# Patient Record
Sex: MALE | URBAN - METROPOLITAN AREA
[De-identification: names, ages, dates, MRNs, and addresses within clinical notes are randomized per-mention and may not be internally consistent; named-entity substitution may affect disease eponyms.]

---

## 2019-01-20 ENCOUNTER — EMERGENCY (EMERGENCY)
Facility: HOSPITAL | Age: 49
LOS: 1 days | Discharge: HOME | End: 2019-01-20
Attending: EMERGENCY MEDICINE | Admitting: EMERGENCY MEDICINE
Payer: COMMERCIAL

## 2019-01-20 VITALS
OXYGEN SATURATION: 97 % | HEART RATE: 84 BPM | RESPIRATION RATE: 16 BRPM | TEMPERATURE: 99 F | DIASTOLIC BLOOD PRESSURE: 66 MMHG

## 2019-01-20 VITALS
HEART RATE: 95 BPM | SYSTOLIC BLOOD PRESSURE: 143 MMHG | RESPIRATION RATE: 16 BRPM | DIASTOLIC BLOOD PRESSURE: 85 MMHG | WEIGHT: 203.93 LBS | TEMPERATURE: 99 F | HEIGHT: 69 IN

## 2019-01-20 LAB
ALBUMIN SERPL ELPH-MCNC: 4 G/DL — SIGNIFICANT CHANGE UP (ref 3.3–5)
ALP SERPL-CCNC: 72 U/L — SIGNIFICANT CHANGE UP (ref 30–120)
ALT FLD-CCNC: 39 U/L DA — SIGNIFICANT CHANGE UP (ref 10–60)
ANION GAP SERPL CALC-SCNC: 12 MMOL/L — SIGNIFICANT CHANGE UP (ref 5–17)
AST SERPL-CCNC: 26 U/L — SIGNIFICANT CHANGE UP (ref 10–40)
BASOPHILS # BLD AUTO: 0.04 K/UL — SIGNIFICANT CHANGE UP (ref 0–0.2)
BASOPHILS NFR BLD AUTO: 0.6 % — SIGNIFICANT CHANGE UP (ref 0–2)
BILIRUB SERPL-MCNC: 1.8 MG/DL — HIGH (ref 0.2–1.2)
BUN SERPL-MCNC: 16 MG/DL — SIGNIFICANT CHANGE UP (ref 7–23)
CALCIUM SERPL-MCNC: 9.4 MG/DL — SIGNIFICANT CHANGE UP (ref 8.4–10.5)
CHLORIDE SERPL-SCNC: 104 MMOL/L — SIGNIFICANT CHANGE UP (ref 96–108)
CO2 SERPL-SCNC: 26 MMOL/L — SIGNIFICANT CHANGE UP (ref 22–31)
CREAT SERPL-MCNC: 0.99 MG/DL — SIGNIFICANT CHANGE UP (ref 0.5–1.3)
EOSINOPHIL # BLD AUTO: 0.05 K/UL — SIGNIFICANT CHANGE UP (ref 0–0.5)
EOSINOPHIL NFR BLD AUTO: 0.8 % — SIGNIFICANT CHANGE UP (ref 0–6)
GLUCOSE SERPL-MCNC: 127 MG/DL — HIGH (ref 70–99)
HCT VFR BLD CALC: 51 % — HIGH (ref 39–50)
HGB BLD-MCNC: 17.6 G/DL — HIGH (ref 13–17)
IMM GRANULOCYTES NFR BLD AUTO: 0.2 % — SIGNIFICANT CHANGE UP (ref 0–1.5)
LYMPHOCYTES # BLD AUTO: 1.98 K/UL — SIGNIFICANT CHANGE UP (ref 1–3.3)
LYMPHOCYTES # BLD AUTO: 31.4 % — SIGNIFICANT CHANGE UP (ref 13–44)
MCHC RBC-ENTMCNC: 30.8 PG — SIGNIFICANT CHANGE UP (ref 27–34)
MCHC RBC-ENTMCNC: 34.5 GM/DL — SIGNIFICANT CHANGE UP (ref 32–36)
MCV RBC AUTO: 89.2 FL — SIGNIFICANT CHANGE UP (ref 80–100)
MONOCYTES # BLD AUTO: 0.64 K/UL — SIGNIFICANT CHANGE UP (ref 0–0.9)
MONOCYTES NFR BLD AUTO: 10.1 % — SIGNIFICANT CHANGE UP (ref 2–14)
NEUTROPHILS # BLD AUTO: 3.59 K/UL — SIGNIFICANT CHANGE UP (ref 1.8–7.4)
NEUTROPHILS NFR BLD AUTO: 56.9 % — SIGNIFICANT CHANGE UP (ref 43–77)
NRBC # BLD: 0 /100 WBCS — SIGNIFICANT CHANGE UP (ref 0–0)
PLATELET # BLD AUTO: 240 K/UL — SIGNIFICANT CHANGE UP (ref 150–400)
POTASSIUM SERPL-MCNC: 3.8 MMOL/L — SIGNIFICANT CHANGE UP (ref 3.5–5.3)
POTASSIUM SERPL-SCNC: 3.8 MMOL/L — SIGNIFICANT CHANGE UP (ref 3.5–5.3)
PROT SERPL-MCNC: 7.8 G/DL — SIGNIFICANT CHANGE UP (ref 6–8.3)
RBC # BLD: 5.72 M/UL — SIGNIFICANT CHANGE UP (ref 4.2–5.8)
RBC # FLD: 12.4 % — SIGNIFICANT CHANGE UP (ref 10.3–14.5)
SODIUM SERPL-SCNC: 142 MMOL/L — SIGNIFICANT CHANGE UP (ref 135–145)
WBC # BLD: 6.31 K/UL — SIGNIFICANT CHANGE UP (ref 3.8–10.5)
WBC # FLD AUTO: 6.31 K/UL — SIGNIFICANT CHANGE UP (ref 3.8–10.5)

## 2019-01-20 PROCEDURE — 99283 EMERGENCY DEPT VISIT LOW MDM: CPT

## 2019-01-20 PROCEDURE — 36415 COLL VENOUS BLD VENIPUNCTURE: CPT

## 2019-01-20 PROCEDURE — 85027 COMPLETE CBC AUTOMATED: CPT

## 2019-01-20 PROCEDURE — 80053 COMPREHEN METABOLIC PANEL: CPT

## 2019-01-20 NOTE — ED PROVIDER NOTE - MEDICAL DECISION MAKING DETAILS
Hyperkalemia on outside lab test yesterday. No symptoms. Likely lab error. Plan - Repeat blood test today.

## 2019-01-20 NOTE — ED ADULT NURSE NOTE - MUSCULOSKELETAL WDL
Full range of motion of upper and lower extremities, no joint tenderness/swelling. Principal Discharge DX:	Bronchitis

## 2019-01-20 NOTE — ED PROVIDER NOTE - CHPI ED SYMPTOMS NEG
no vomiting/no back pain/no fever/no headache/no dizziness/no pain/no loss of consciousness/no nausea/no chills/no decreased eating/drinking

## 2019-01-20 NOTE — ED ADULT NURSE NOTE - OBJECTIVE STATEMENT
Sent by PMD for elevated potassium level, 6.5. Non hemolyzed. Blood drawn yesterday. Pt with no complaints.

## 2019-01-20 NOTE — ED PROVIDER NOTE - OBJECTIVE STATEMENT
47 yo male had routine labs done at PMD office yesterday. Was called by MD today for hyperkalemia. K=6.4 reportedly not hemolyzed. Was told to get repeat K drawn today. Denies any symptoms, feels well.   PMD Wilfrid

## 2019-07-04 ENCOUNTER — EMERGENCY (EMERGENCY)
Facility: HOSPITAL | Age: 49
LOS: 1 days | Discharge: ANOTHER HOSPITAL | End: 2019-07-04
Attending: EMERGENCY MEDICINE | Admitting: EMERGENCY MEDICINE
Payer: COMMERCIAL

## 2019-07-04 ENCOUNTER — EMERGENCY (EMERGENCY)
Facility: HOSPITAL | Age: 49
LOS: 1 days | Discharge: HOME | End: 2019-07-04
Attending: EMERGENCY MEDICINE
Payer: COMMERCIAL

## 2019-07-04 VITALS
WEIGHT: 199.96 LBS | HEIGHT: 69 IN | SYSTOLIC BLOOD PRESSURE: 137 MMHG | OXYGEN SATURATION: 99 % | HEART RATE: 162 BPM | DIASTOLIC BLOOD PRESSURE: 77 MMHG | RESPIRATION RATE: 20 BRPM | TEMPERATURE: 98 F

## 2019-07-04 VITALS
RESPIRATION RATE: 14 BRPM | TEMPERATURE: 98 F | SYSTOLIC BLOOD PRESSURE: 125 MMHG | HEART RATE: 115 BPM | OXYGEN SATURATION: 97 % | DIASTOLIC BLOOD PRESSURE: 78 MMHG

## 2019-07-04 VITALS — SYSTOLIC BLOOD PRESSURE: 147 MMHG | HEART RATE: 128 BPM | DIASTOLIC BLOOD PRESSURE: 72 MMHG | TEMPERATURE: 98 F

## 2019-07-04 VITALS
SYSTOLIC BLOOD PRESSURE: 147 MMHG | DIASTOLIC BLOOD PRESSURE: 90 MMHG | TEMPERATURE: 99 F | OXYGEN SATURATION: 99 % | HEIGHT: 69 IN | WEIGHT: 199.96 LBS | HEART RATE: 85 BPM | RESPIRATION RATE: 18 BRPM

## 2019-07-04 LAB
ANION GAP SERPL CALC-SCNC: 7 MMOL/L — SIGNIFICANT CHANGE UP (ref 5–17)
APTT BLD: 30.7 SEC — SIGNIFICANT CHANGE UP (ref 28.5–37)
BASOPHILS # BLD AUTO: 0.06 K/UL — SIGNIFICANT CHANGE UP (ref 0–0.2)
BASOPHILS NFR BLD AUTO: 0.6 % — SIGNIFICANT CHANGE UP (ref 0–2)
BUN SERPL-MCNC: 21 MG/DL — SIGNIFICANT CHANGE UP (ref 7–23)
CALCIUM SERPL-MCNC: 9.4 MG/DL — SIGNIFICANT CHANGE UP (ref 8.4–10.5)
CHLORIDE SERPL-SCNC: 105 MMOL/L — SIGNIFICANT CHANGE UP (ref 96–108)
CO2 SERPL-SCNC: 29 MMOL/L — SIGNIFICANT CHANGE UP (ref 22–31)
CREAT SERPL-MCNC: 1 MG/DL — SIGNIFICANT CHANGE UP (ref 0.5–1.3)
EOSINOPHIL # BLD AUTO: 0.23 K/UL — SIGNIFICANT CHANGE UP (ref 0–0.5)
EOSINOPHIL NFR BLD AUTO: 2.3 % — SIGNIFICANT CHANGE UP (ref 0–6)
GLUCOSE SERPL-MCNC: 134 MG/DL — HIGH (ref 70–99)
HCT VFR BLD CALC: 51.5 % — HIGH (ref 39–50)
HGB BLD-MCNC: 17.4 G/DL — HIGH (ref 13–17)
IMM GRANULOCYTES NFR BLD AUTO: 0.3 % — SIGNIFICANT CHANGE UP (ref 0–1.5)
INR BLD: 1.09 RATIO — SIGNIFICANT CHANGE UP (ref 0.88–1.16)
LYMPHOCYTES # BLD AUTO: 4.2 K/UL — HIGH (ref 1–3.3)
LYMPHOCYTES # BLD AUTO: 42.1 % — SIGNIFICANT CHANGE UP (ref 13–44)
MAGNESIUM SERPL-MCNC: 1.9 MG/DL — SIGNIFICANT CHANGE UP (ref 1.6–2.6)
MCHC RBC-ENTMCNC: 31.1 PG — SIGNIFICANT CHANGE UP (ref 27–34)
MCHC RBC-ENTMCNC: 33.8 GM/DL — SIGNIFICANT CHANGE UP (ref 32–36)
MCV RBC AUTO: 92 FL — SIGNIFICANT CHANGE UP (ref 80–100)
MONOCYTES # BLD AUTO: 1.27 K/UL — HIGH (ref 0–0.9)
MONOCYTES NFR BLD AUTO: 12.7 % — SIGNIFICANT CHANGE UP (ref 2–14)
NEUTROPHILS # BLD AUTO: 4.18 K/UL — SIGNIFICANT CHANGE UP (ref 1.8–7.4)
NEUTROPHILS NFR BLD AUTO: 42 % — LOW (ref 43–77)
NRBC # BLD: 0 /100 WBCS — SIGNIFICANT CHANGE UP (ref 0–0)
PLATELET # BLD AUTO: 253 K/UL — SIGNIFICANT CHANGE UP (ref 150–400)
POTASSIUM SERPL-MCNC: 3.8 MMOL/L — SIGNIFICANT CHANGE UP (ref 3.5–5.3)
POTASSIUM SERPL-SCNC: 3.8 MMOL/L — SIGNIFICANT CHANGE UP (ref 3.5–5.3)
PROTHROM AB SERPL-ACNC: 11.9 SEC — SIGNIFICANT CHANGE UP (ref 10–12.9)
RBC # BLD: 5.6 M/UL — SIGNIFICANT CHANGE UP (ref 4.2–5.8)
RBC # FLD: 12.8 % — SIGNIFICANT CHANGE UP (ref 10.3–14.5)
SODIUM SERPL-SCNC: 141 MMOL/L — SIGNIFICANT CHANGE UP (ref 135–145)
TROPONIN I SERPL-MCNC: 0 NG/ML — LOW (ref 0.02–0.06)
WBC # BLD: 9.97 K/UL — SIGNIFICANT CHANGE UP (ref 3.8–10.5)
WBC # FLD AUTO: 9.97 K/UL — SIGNIFICANT CHANGE UP (ref 3.8–10.5)

## 2019-07-04 PROCEDURE — 99285 EMERGENCY DEPT VISIT HI MDM: CPT | Mod: 25

## 2019-07-04 PROCEDURE — 96374 THER/PROPH/DIAG INJ IV PUSH: CPT

## 2019-07-04 PROCEDURE — 99285 EMERGENCY DEPT VISIT HI MDM: CPT

## 2019-07-04 PROCEDURE — 84484 ASSAY OF TROPONIN QUANT: CPT

## 2019-07-04 PROCEDURE — 96375 TX/PRO/DX INJ NEW DRUG ADDON: CPT

## 2019-07-04 PROCEDURE — 85730 THROMBOPLASTIN TIME PARTIAL: CPT

## 2019-07-04 PROCEDURE — 93005 ELECTROCARDIOGRAM TRACING: CPT

## 2019-07-04 PROCEDURE — 85027 COMPLETE CBC AUTOMATED: CPT

## 2019-07-04 PROCEDURE — 71045 X-RAY EXAM CHEST 1 VIEW: CPT | Mod: 26

## 2019-07-04 PROCEDURE — 71045 X-RAY EXAM CHEST 1 VIEW: CPT

## 2019-07-04 PROCEDURE — 99284 EMERGENCY DEPT VISIT MOD MDM: CPT | Mod: 25

## 2019-07-04 PROCEDURE — 85610 PROTHROMBIN TIME: CPT

## 2019-07-04 PROCEDURE — 99284 EMERGENCY DEPT VISIT MOD MDM: CPT

## 2019-07-04 PROCEDURE — 93005 ELECTROCARDIOGRAM TRACING: CPT | Mod: 76

## 2019-07-04 PROCEDURE — 80048 BASIC METABOLIC PNL TOTAL CA: CPT

## 2019-07-04 PROCEDURE — 36415 COLL VENOUS BLD VENIPUNCTURE: CPT

## 2019-07-04 PROCEDURE — 83735 ASSAY OF MAGNESIUM: CPT

## 2019-07-04 PROCEDURE — 93010 ELECTROCARDIOGRAM REPORT: CPT | Mod: 76

## 2019-07-04 PROCEDURE — 93010 ELECTROCARDIOGRAM REPORT: CPT

## 2019-07-04 RX ORDER — SODIUM CHLORIDE 9 MG/ML
1000 INJECTION INTRAMUSCULAR; INTRAVENOUS; SUBCUTANEOUS ONCE
Refills: 0 | Status: COMPLETED | OUTPATIENT
Start: 2019-07-04 | End: 2019-07-04

## 2019-07-04 RX ORDER — METOPROLOL TARTRATE 50 MG
1 TABLET ORAL
Qty: 30 | Refills: 0
Start: 2019-07-04 | End: 2019-08-02

## 2019-07-04 RX ORDER — DILTIAZEM HCL 120 MG
10 CAPSULE, EXT RELEASE 24 HR ORAL ONCE
Refills: 0 | Status: COMPLETED | OUTPATIENT
Start: 2019-07-04 | End: 2019-07-04

## 2019-07-04 RX ORDER — METOPROLOL TARTRATE 50 MG
5 TABLET ORAL ONCE
Refills: 0 | Status: COMPLETED | OUTPATIENT
Start: 2019-07-04 | End: 2019-07-04

## 2019-07-04 RX ORDER — NIACIN 50 MG
0 TABLET ORAL
Qty: 0 | Refills: 0 | DISCHARGE

## 2019-07-04 RX ORDER — DILTIAZEM HCL 120 MG
30 CAPSULE, EXT RELEASE 24 HR ORAL ONCE
Refills: 0 | Status: COMPLETED | OUTPATIENT
Start: 2019-07-04 | End: 2019-07-04

## 2019-07-04 RX ORDER — METOPROLOL TARTRATE 50 MG
50 TABLET ORAL ONCE
Refills: 0 | Status: COMPLETED | OUTPATIENT
Start: 2019-07-04 | End: 2019-07-04

## 2019-07-04 RX ADMIN — Medication 5 MILLIGRAM(S): at 08:45

## 2019-07-04 RX ADMIN — Medication 50 MILLIGRAM(S): at 10:59

## 2019-07-04 RX ADMIN — SODIUM CHLORIDE 1000 MILLILITER(S): 9 INJECTION INTRAMUSCULAR; INTRAVENOUS; SUBCUTANEOUS at 03:20

## 2019-07-04 RX ADMIN — SODIUM CHLORIDE 1000 MILLILITER(S): 9 INJECTION INTRAMUSCULAR; INTRAVENOUS; SUBCUTANEOUS at 04:14

## 2019-07-04 RX ADMIN — SODIUM CHLORIDE 1000 MILLILITER(S): 9 INJECTION INTRAMUSCULAR; INTRAVENOUS; SUBCUTANEOUS at 05:08

## 2019-07-04 RX ADMIN — Medication 10 MILLIGRAM(S): at 05:52

## 2019-07-04 RX ADMIN — Medication 10 MILLIGRAM(S): at 03:52

## 2019-07-04 RX ADMIN — Medication 30 MILLIGRAM(S): at 05:54

## 2019-07-04 NOTE — ED PROVIDER NOTE - CLINICAL SUMMARY MEDICAL DECISION MAKING FREE TEXT BOX
49M, hx of HLD, presents as transfer from [facility] for further cardiology evaluation after presenting with palpitations, noted to be tachycardic to 160s - 190s. No other associated sx. Labs, CXR wnl. Will consult cardiology for further care. Will administer further dose of diltiazem.  Currently stable, no acute distress. Will continue to follow up and re-assess. Case discussed with Attending.  Alireza Monreal MD, PGY3 Emergency Medicine 49M, hx of HLD, presents as transfer from [facility] for further cardiology evaluation after presenting with palpitations, noted to be tachycardic to 160s - 190s. No other associated sx. Labs, CXR wnl. Will consult cardiology for further care. EKG concerning for possible MAT. Will administer further dose of diltiazem.  Currently stable, no acute distress. Will continue to follow up and re-assess. Case discussed with Attending.  Alireza Monreal MD, PGY3 Emergency Medicine

## 2019-07-04 NOTE — ED ADULT NURSE REASSESSMENT NOTE - NS ED NURSE REASSESS COMMENT FT1
Pt resting quietly on stretcher with cardiac monitor in place showing sinus arrythmia. Safety and comfort measures maintained.

## 2019-07-04 NOTE — ED PROVIDER NOTE - CLINICAL SUMMARY MEDICAL DECISION MAKING FREE TEXT BOX
49 y.o. M with h/o dyslipidemia, now presents with palpitations, never had this before, pt's heartrate rapidly fluctuating between 90s-200, appears sinus with multifocal PVCs when slows, BP normal, pt otherwise asymptomatic, iv, hydrate, labs/e-lytes/trop, ekgs, will d/w cardiology [facility]

## 2019-07-04 NOTE — ED PROVIDER NOTE - NSFOLLOWUPINSTRUCTIONS_ED_ALL_ED_FT
1. Return to ED for worsening, progressive or any other concerning symptoms   2. Follow up with your primary care doctor in 2-3days  3. Follow up with Dr. Montel[facility].  4. Take metoprolol 50mg daily.  5. Return if you have chest pain, you feel palpitations, you don't feel like your normal self.

## 2019-07-04 NOTE — ED PROVIDER NOTE - PHYSICAL EXAMINATION
General: Well appearing, alert, oriented, no acute distress. Resting in bed.  HEENT: PERRLA EOMI. No trauma/bruising noted to head or face.   CV: TACHY rate and regular rhythm, S1/S2, no murmurs/rubs/gallops noted on exam.   Lungs: Clear to ascultation bilaterally, no wheezes/crackles/rales noted on exam.   Abdomen: Soft, non tender, non distended, no guarding or rebound.   MSK: Full ROM of upper and lower extremities bilaterally. Full ROM of neck. No gross deformities noted to extremities.  Neuro: Awake, A+O x4, moving all extremities spontaneously. CN 2-12 grossly intact. Strength and sensation grossly intact to all extremities.  Extremities: No swelling or edema noted to extremities. No calf tenderness to palpation.   Skin: No rash noted on exam.

## 2019-07-04 NOTE — ED PROVIDER NOTE - ATTENDING CONTRIBUTION TO CARE
attending Lynette: 49yM h/o HLD transferred from [facility] for cardiology evaluation for palpitations. Presented with HR 160s - 190s. No other associated sx. Will place on tele, repeat ekg, review OSH labs, additional dose Cardizem for tachycardia, consult cardiology for concern for MAT

## 2019-07-04 NOTE — ED PROVIDER NOTE - NOTES
discussed case - pt with palpitations rapidly alternating between HR in 90s and 190s, EKG at 180 reading as afib, however slower EKGs are sinus/sinus tach with multifocal PVCs, faxed EKGs, cardiology fellow recommends dose of diltiazem IV and then oral diltiazem 30mg q6hr following, states will likely need exercise stress as further work up, awaiting labs, no further recommendations at this time and no need for transfer at this time

## 2019-07-04 NOTE — ED PROVIDER NOTE - OBJECTIVE STATEMENT
49M, hx of HLD, presents as transfer from [facility] for further cardiology evaluation. Patient woke up from sleep around 3am with palpitations. No associated fevers or chills, nausea or vomiting, chest pain, shortness of breath, abdominal pain, diarrhea or constipation, urinary sx, cough, headache, dizziness, lightheadedness, blurry vision, weakness, numbness, back or neck pain. Patient said he drank ~6 beers yesterday plus some vodka while palying golf, not a normal alcohol drinker.  No recent travel, illness, sick contacts. No leg swelling, calf pain. No hx DVT/PE. No hx thyroid disease. No supplement use, no caffeine, no tobacco, ethanol, or drug use. No allergies.   Son has hx of SVT requiring ablation.   PCP Dr. DEZ Medina    Patient seen at [place] - received 3L fluid, 10 diltiazem. Labs all grossly wnl. Sent to [facility] for further evaluation.

## 2019-07-04 NOTE — ED ADULT NURSE NOTE - OBJECTIVE STATEMENT
50 y/o Male presenting to ED by EMS, A&Ox3, transferred from [place] for tachycardiac arrythmia and cardiac consult. Pt went to [place] ED for feelings of palpitations that awoke him from his sleep. Pt received Cardizem at [place] which brought his heart rate down from the 170's to 140's. Pt in the 150's upon arrival on the cardiac monitor. 10 IV Cardizem and 30 PO given upon arrival and pt heart rate decreased to 120's. Pt denies chest pain, shortness of breath, dizziness, headache, N/V/D, fever, chills, recent travel, smoking. Skin warm dry and intact. Pt appears hyperactive at this time, pt speaking very fast and appears extremely anxious. Pt denies any drug use, pt reports alcohol use yesterday afternoon. Safety and comfort measures provided. Bed in lowest position. Side rails up for safety. Pt remains on cardiac monitor. 48 y/o Male presenting to ED by EMS, A&Ox3, transferred from [place] for tachycardiac arrythmia and cardiac consult. Pt went to [place] ED for feelings of palpitations that awoke him from his sleep. Pt received Cardizem at [place] which brought his heart rate down from the 170's to 140's. Pt in the 150's upon arrival on the cardiac monitor. 10 IV Cardizem and 30 PO given upon arrival and pt heart rate decreased to 120's. Pt denies chest pain, shortness of breath, dizziness, headache, N/V/D, fever, chills, recent travel, smoking. Skin warm dry and intact. Pt appears hyperactive at this time, pt speaking very fast and appears extremely anxious. Pt denies any drug use, pt reports alcohol use yesterday afternoon. Safety and comfort measures provided. Bed in lowest position. Side rails up for safety. Pt remains on cardiac monitor. Awaiting cardiac consult.

## 2019-07-04 NOTE — CONSULT NOTE ADULT - SUBJECTIVE AND OBJECTIVE BOX
MRN-[id number]    CHIEF COMPLAINT:  Patient is a 49y old  Male who presents with a chief complaint of     HISTORY OF PRESENT ILLNESS:  VIRAJ THORPE is a 49y Male patient with past medical history of *** presenting with ***.     Allergies    No Known Allergies    Intolerances    	    PAST MEDICAL & SURGICAL HISTORY:  High cholesterol  No pertinent past medical history  No significant past surgical history      FAMILY HISTORY:      SOCIAL HISTORY:    [ ] Non-smoker  [ ] Smoker  [ ] Alcohol    REVIEW OF SYSTEMS:  CONSTITUTIONAL: No fever, weight loss, or fatigue  EYES: No eye pain, visual disturbances, or discharge  ENMT:  No difficulty hearing, tinnitus, vertigo; No sinus or throat pain  NECK: No pain or stiffness  RESPIRATORY: No cough, wheezing, chills or hemoptysis; No Shortness of Breath  CARDIOVASCULAR: No chest pain, palpitations, passing out, dizziness, or leg swelling  GASTROINTESTINAL: No abdominal or epigastric pain. No nausea, vomiting, or hematemesis; No diarrhea or constipation. No melena or hematochezia.  GENITOURINARY: No dysuria, frequency, hematuria, or incontinence  NEUROLOGICAL: No headaches, memory loss, loss of strength, numbness, or tremors  SKIN: No itching, burning, rashes, or lesions   LYMPH Nodes: No enlarged glands  ENDOCRINE: No heat or cold intolerance; No hair loss  MUSCULOSKELETAL: No joint pain or swelling; No muscle, back, or extremity pain  PSYCHIATRIC: No depression, anxiety, mood swings, or difficulty sleeping  HEME/LYMPH: No easy bruising, or bleeding gums  ALLERY AND IMMUNOLOGIC: No hives or eczema	    [ ] All others negative	  [ ] Unable to obtain    I&O's Summary      PHYSICAL EXAM:  Vital Signs Last 24 Hrs  T(C): 36.8 (04 Jul 2019 05:38), Max: 37.3 (04 Jul 2019 03:04)  T(F): 98.3 (04 Jul 2019 05:38), Max: 99.2 (04 Jul 2019 03:04)  HR: 121 (04 Jul 2019 05:55) (85 - 205)  BP: 140/78 (04 Jul 2019 05:55) (132/55 - 169/84)  BP(mean): --  RR: 18 (04 Jul 2019 05:55) (18 - 22)  SpO2: 98% (04 Jul 2019 05:55) (98% - 99%)  Appearance: Normal	  HEENT:   Normal oral mucosa, PERRL, EOMI	  Lymphatic: No lymphadenopathy  Cardiovascular: Normal S1 S2, No JVD, No murmurs, No edema  Respiratory: Lungs clear to auscultation	  Psychiatry: A & O x 3, Mood & affect appropriate  Gastrointestinal:  Soft, Non-tender, + BS	  Skin: No rashes, No ecchymoses, No cyanosis	  Neurologic: Non-focal  Extremities: Normal range of motion, No clubbing, cyanosis or edema  Vascular: Peripheral pulses palpable 2+ bilaterally    MEDICATIONS:  MEDICATIONS  (STANDING):    MEDICATIONS  (PRN):      LABS:	 	  CBC Full  -  ( 04 Jul 2019 03:32 )  WBC Count : 9.97 K/uL  Hemoglobin : 17.4 g/dL  Hematocrit : 51.5 %  Platelet Count - Automated : 253 K/uL  Mean Cell Volume : 92.0 fl  Mean Cell Hemoglobin : 31.1 pg  Mean Cell Hemoglobin Concentration : 33.8 gm/dL  Auto Neutrophil # : 4.18 K/uL  Auto Lymphocyte # : 4.20 K/uL  Auto Monocyte # : 1.27 K/uL  Auto Eosinophil # : 0.23 K/uL  Auto Basophil # : 0.06 K/uL  Auto Neutrophil % : 42.0 %  Auto Lymphocyte % : 42.1 %  Auto Monocyte % : 12.7 %  Auto Eosinophil % : 2.3 %  Auto Basophil % : 0.6 %    07-04    141  |  105  |  21  ----------------------------<  134<H>  3.8   |  29  |  1.00    Ca    9.4      04 Jul 2019 03:32  Mg     1.9     07-04      PT/INR - ( 04 Jul 2019 04:09 )   PT: 11.9 sec;   INR: 1.09 ratio         PTT - ( 04 Jul 2019 04:09 )  PTT:30.7 sec  CARDIAC MARKERS ( 04 Jul 2019 03:52 )  .000 ng/mL / x     / x     / x     / x            proBNP:   Lipid Profile:   HgA1c:   TSH:     TELEMETRY: 	    ECG:  	  RADIOLOGY:  OTHER: 	    CARDIAC TESTING/STUDIES:    [ ] Echocardiogram:  [ ]  Catheterization:  [ ] Stress Test:  	  	  ASSESSMENT/PLAN: 	      Laurie Sullivan MD, MPH, VIVI  Cardiovascular Specialist Attending  [facility]  C: [phone]  E: [email or web address]  (Cardiology Nocturnist cell number available 7 pm - 7 am every night; available daytime week days for follow-up only; daytime weekends covered by general cardiology consult service) MRN-[id number]    CHIEF COMPLAINT:  Palpitations     HISTORY OF PRESENT ILLNESS:  VIRAJ THORPE is a 49y Male patient with past medical history of HLD, presenting with palpitations and no other associated symptoms after drinking more alcohol than his usual amount. No other medical problems and does not take any medications. ECG with ***, Telemetry rates 100-140. Cardiac enzyme negative. Given IVF bolus as well 10 mg diltiazem with minimal improvement. Cardiology consulted for further management.      Allergies  No Known Allergies	    PAST MEDICAL & SURGICAL HISTORY:  High cholesterol  No pertinent past medical history  No significant past surgical history    FAMILY HISTORY:  Son with SVT ablation.     SOCIAL HISTORY:    Alcohol    REVIEW OF SYSTEMS:  CONSTITUTIONAL: No fever, weight loss, or fatigue  EYES: No eye pain  ENMT:  No difficulty hearing  NECK: No pain or stiffness  RESPIRATORY: No cough, wheezing; No Shortness of Breath  CARDIOVASCULAR: No chest pain, passing out, dizziness, or leg swelling. Positive palpitations.   GASTROINTESTINAL: No abdominal or epigastric pain. No nausea, vomiting, or hematemesis; No diarrhea or constipation.  GENITOURINARY: No dysuria, frequency  NEUROLOGICAL: No headaches, memory loss, loss of strength.   SKIN: No itching, burning, rashes.   MUSCULOSKELETAL: No joint pain or swelling  PSYCHIATRIC: No depression, anxiety  HEME/LYMPH: No easy bruising,    PHYSICAL EXAM:  Vital Signs Last 24 Hrs  T(C): 36.8 (2019 05:38), Max: 37.3 (2019 03:04)  T(F): 98.3 (2019 05:38), Max: 99.2 (2019 03:04)  HR: 121 (2019 05:55) (85 - 205)  BP: 140/78 (2019 05:55) (132/55 - 169/84)  RR: 18 (2019 05:55) (18 - 22)  SpO2: 98% (2019 05:55) (98% - 99%)    Appearance: Normal	  HEENT:   Normal oral mucosa	  Lymphatic: No lymphadenopathy  Cardiovascular: Normal S1 S2, No edema, irregularly irregular tachycardic.   Respiratory: Lungs clear to auscultation  Psychiatry: A & O x 3  Gastrointestinal:  Soft, Non-tender  Skin: No rashes, No ecchymoses, No cyanosis	  Neurologic: Non-focal  Extremities: No clubbing, cyanosis or edema  Vascular: Peripheral pulses palpable 2+ bilaterally    LABS:	 	  CBC Full  -  ( 2019 03:32 )  WBC Count : 9.97 K/uL  Hemoglobin : 17.4 g/dL  Hematocrit : 51.5 %  Platelet Count - Automated : 253 K/uL  Mean Cell Volume : 92.0 fl  Mean Cell Hemoglobin : 31.1 pg  Mean Cell Hemoglobin Concentration : 33.8 gm/dL  Auto Neutrophil # : 4.18 K/uL  Auto Lymphocyte # : 4.20 K/uL  Auto Monocyte # : 1.27 K/uL  Auto Eosinophil # : 0.23 K/uL  Auto Basophil # : 0.06 K/uL  Auto Neutrophil % : 42.0 %  Auto Lymphocyte % : 42.1 %  Auto Monocyte % : 12.7 %  Auto Eosinophil % : 2.3 %  Auto Basophil % : 0.6 %    07-    141  |  105  |  21  ----------------------------<  134<H>  3.8   |  29  |  1.00    Ca    9.4      2019 03:32  Mg     1.9     07-04    PT/INR - ( 2019 04:09 )   PT: 11.9 sec;   INR: 1.09 ratio      PTT - ( 2019 04:09 )  PTT:30.7 sec  CARDIAC MARKERS ( 2019 03:52 )  .000 ng/mL / x     / x     / x     / x        TELEMETRY: 2019   Tachycardic     EC2019  ***    CARDIAC TESTING/STUDIES:    N/A  	  ASSESSMENT/PLAN: 	  9y Male patient with past medical history of HLD, presenting with palpitations found with narrow complex, irregularly irregular tachycardia.     1) Irregularly irregular tachycardia.   Pending detailed review   Asymptomatic    Hemodynamically stable.   Minimal diltiazem response.   Differential, MAT, Atrial Fibrillation, Atrial Flutter, SVT (AVNRT, AVRT, AT); assessing RP interval pending. In th setting of no pulmonary pathology, MAT would be unlikely but not impossible.     ·	Given minimal response to diltiazem, it is reasonable to attempt 5 mg IV metoprolol push as it is also an AV ludin blocking agent with different properties than diltiazem of which he may respond more favorably to.   ·	Lastly, if he does not respond within 30 - 60 minutes, we can trial adenosine 6 mg IV push followed by saline flush, which will allow us to clearly visualize that hte atrial are doing (p waves) and make a solid diagnosis. If 6 mg does not work, attempt a second time, and if that doesn't work, the third time should be 12 mg once. It was cause a pause of approximatively 3-8 seconds, and the patient will feel flushed. But this ia very safe drug and does not require post administration monitoring Run the ECG while injecting adenosine if this approach is taken after inadequate response to diltiazem and metoprolol.     Call failed while speaking with the ED and unable to reach at his time. Please call personal mobile phone below as needed. Will stop by in approximately 15 -30 minutes.     Laurie Sullivan MD, MPH, VIVI  Cardiovascular Specialist Attending  [facility]  C: [phone]  Personal Mobile: [phone]   E: [email or web address]  (Cardiology Nocturnist cell number available 7 pm - 7 am every night; available daytime week days for follow-up only; daytime weekends covered by general cardiology consult service) MRN-[id number]    CHIEF COMPLAINT:  Palpitations     HISTORY OF PRESENT ILLNESS:  VIRAJ THORPE is a 49y Male patient with past medical history of HLD, presenting with palpitations and no other associated symptoms after drinking more alcohol than his usual amount. No other medical problems and does not take any medications. ECG with atrial tachycardia and intermittent rapid runs, Telemetry rates  upon evaluation. Cardiac enzyme negative. Given IVF bolus as well 10 mg diltiazem with minimal improvement. Given IV 5 mg metoprolol push and his the frequency of atrial tachycardia decreased substantially Starting on PO metoprolol succinate 50 mg daily. Counseled on alcohol cessation. Upon further questions, he reports that he will occasionally feel one ot two "skipped beats" but did not think much about it. Exacerbation likely related to his alcohol consumption of which he reports 4-5 drinks at once, everything 3-4 days and then make your ioana to likely secondary to alcohol consumption of which the wifes cant come with hyus .   despite having a fairly large AT burden. Cardiology consulted for further management of cardiac issues and transportation.     Allergies  No Known Allergies	    PAST MEDICAL & SURGICAL HISTORY:  High cholesterol  No pertinent past medical history  No significant past surgical history    FAMILY HISTORY:  Son with SVT ablation.     SOCIAL HISTORY:    Alcohol    REVIEW OF SYSTEMS:  CONSTITUTIONAL: No fever, weight loss, Pisitive  EYES: No eye pain  ENMT:  No difficulty hearing  NECK: No pain or stiffness  RESPIRATORY: No cough, wheezing; No Shortness of Breath  CARDIOVASCULAR: No chest pain, passing out, dizziness, or leg swelling. Positive palpitations.   GASTROINTESTINAL: No abdominal or epigastric pain. No nausea, vomiting.  NEUROLOGICAL: No headaches, memory loss, loss of strength.   SKIN: No itching, burning, rashes.   MUSCULOSKELETAL: No joint pain. Positive swelling  PSYCHIATRIC: No depression, anxiety.  HEME/LYMPH: No easy bruising,    PHYSICAL EXAM:  Vital Signs Last 24 Hrs  T(C): 36.8 (2019 05:38), Max: 37.3 (2019 03:04)  T(F): 98.3 (2019 05:38), Max: 99.2 (2019 03:04)  HR: 121 (2019 05:55) (85 - 205)  BP: 140/78 (2019 05:55) (132/55 - 169/84)  RR: 18 (2019 05:55) (18 - 22)  SpO2: 98% (2019 05:55) (98% - 99%)    Appearance: Normal	  HEENT:   Normal oral mucosa	  Lymphatic: No lymphadenopathy  Cardiovascular: Normal S1 S2, No edema, irregularly irregular tachycardic.   Respiratory: Lungs clear to auscultation  Psychiatry: A & O x 3  Gastrointestinal:  Soft, Non-tender  Skin: No rashes, No ecchymoses, No cyanosis	  Neurologic: Non-focal  Extremities: No clubbing, cyanosis or edema  Vascular: Peripheral pulses palpable 2+ bilaterally    LABS:	 	  CBC Full  -  ( 2019 03:32 )  WBC Count : 9.97 K/uL  Hemoglobin : 17.4 g/dL  Hematocrit : 51.5 %  Platelet Count - Automated : 253 K/uL  Mean Cell Volume : 92.0 fl  Mean Cell Hemoglobin : 31.1 pg  Mean Cell Hemoglobin Concentration : 33.8 gm/dL  Auto Neutrophil # : 4.18 K/uL  Auto Lymphocyte # : 4.20 K/uL  Auto Monocyte # : 1.27 K/uL  Auto Eosinophil # : 0.23 K/uL  Auto Basophil # : 0.06 K/uL  Auto Neutrophil % : 42.0 %  Auto Lymphocyte % : 42.1 %  Auto Monocyte % : 12.7 %  Auto Eosinophil % : 2.3 %  Auto Basophil % : 0.6 %    07-04    141  |  105  |  21  ----------------------------<  134<H>  3.8   |  29  |  1.00    Ca    9.4      2019 03:32  Mg     1.9     07-04    PT/INR - ( 2019 04:09 )   PT: 11.9 sec;   INR: 1.09 ratio      PTT - ( 2019 04:09 )  PTT:30.7 sec  CARDIAC MARKERS ( 2019 03:52 )  .000 ng/mL / x     / x     / x     / x        TELEMETRY: 2019   Tachycardic     EC2019  Atrial Tachycardia     CARDIAC TESTING/STUDIES:    N/A  	  ASSESSMENT/PLAN: 	  9y Male patient with past medical history of HLD, presenting with palpitations found with narrow complex, irregularly irregular tachycardia, suggestive of atrial tachycardia.     1) Irregularly irregular tachycardia/ atrial tachycardia  Pending detailed review   Asymptomatic    Hemodynamically stable.   Minimal diltiazem response.   Differential, MAT, Atrial Fibrillation, Atrial Flutter, SVT (AVNRT, AVRT, AT); assessing RP interval pending. In th setting of no pulmonary pathology, MAT would be unlikely but not impossible.     ·	Given minimal response to diltiazem, it is reasonable to attempt 5 mg IV metoprolol push as it is also an AV ludin blocking agent with different properties than diltiazem of which he may respond more favorably to. Start metoprolol cussineate 50 mg daily and starttable,  ·	Lastly, if he does not respond within 30 - 60 minutes, we can trial adenosine 6 mg IV push followed by saline flush, which will allow us to clearly visualize that hte atrial are doing (p waves) and make a solid diagnosis. If 6 mg does not work, attempt a second time, and if that doesn't work, the third time should be 12 mg once. It was cause a pause of approximatively 3-8 seconds, and the patient will feel flushed. But this is very safe drug and does not require post administration monitoring. Run the ECG while injecting adenosine if this approach is taken after inadequate response to diltiazem and metoprolol.     After being administered the IV and PO metoprolol, the frequency of atrial tachycardia significant decreased and is overall heart rate is <110. Acceptable for discharge with  out-patient follow up regarding symptoms.     Laurie Sullivan MD, MPH, VIVI  Cardiovascular Specialist Attending  [facility]  C: [phone]  Personal Mobile: [phone]   E: [email or web address]  (Cardiology Nocturnist cell number available 7 pm - 7 am every night; available daytime week days for follow-up only; daytime weekends covered by general cardiology consult service)

## 2019-07-04 NOTE — ED ADULT NURSE NOTE - NSIMPLEMENTINTERV_GEN_ALL_ED
Implemented All Universal Safety Interventions:  [place] to call system. Call bell, personal items and telephone within reach. Instruct patient to call for assistance. Room bathroom lighting operational. Non-slip footwear when patient is off stretcher. Physically safe environment: no spills, clutter or unnecessary equipment. Stretcher in lowest position, wheels locked, appropriate side rails in place.

## 2019-07-04 NOTE — ED PROVIDER NOTE - CARE PROVIDER_API CALL
Cornelio Laurent (MD)  Cardiac Electrophysiology; Cardiology; Internal Medicine  [address] [place] [address]  Phone: [phone]  Follow Up Time:

## 2019-07-04 NOTE — ED ADULT NURSE REASSESSMENT NOTE - NS ED NURSE REASSESS COMMENT FT1
Pt AAOx4, NAD, resp nonlabored, resting comfortably in bed with family at bedside. Pt c/o slight palpitations. Pt placed on cardiac monitor, sinus arrythmia, HR currently bounces between 90's to 115. Pt denies headache, dizziness, chest pain, SOB, abd pain, n/v/d, urinary symptoms, fevers, chills, weakness at this time. As per MD Maher will hold of the metoprolol because of pt's heart rate. Pt awaiting cardiology consult . Safety maintained.

## 2019-07-04 NOTE — ED PROVIDER NOTE - PROGRESS NOTE DETAILS
Cardiology consulted  Alireza Monreal MD, PGY3 Emergency Medicine Patient re-evaluated.  Labs and imaging reviewed. D/w cardiology, patient to follow up with outpatient cardiology, started on Metoprolol. Currently rate is WNL. Strict return precautions given.  All labs and imaging results (if any), were reviewed with the patient. Patient understands to follow up with their regular doctor. Patient understands to return to the ED is symptoms worsen or progress. Discharge instructions were given to the patient and discussed with patient. Rx (if any) were electronically sent to patient's preferred pharmacy.

## 2019-07-04 NOTE — ED PROVIDER NOTE - OBJECTIVE STATEMENT
49 y.o. M presents for palpitations, started about 1 hr ago, denies h/o similar symptoms in the past, no cp, no sob, no dizziness, at times feeling heart beating very fast. pt did drink a few alcoholic beverages last night, nothing unusual, does not drink daily, non-smoker, denies drugs, denies supplements/herbals, pt has h/o hypertriglyceridemia and high cholesterol - is not taking meds for this anymore (stopped on his own)    PCP = Dr. DEZ Medina    pt's son had ablation for SVT recently at [facility]

## 2019-07-04 NOTE — ED PROVIDER NOTE - PROGRESS NOTE DETAILS
pt agrees to small dose anxiolytic, states his wife tells him he is an anxious person, does appear quite anxious in the ED, as this may be contributing to some of the tachycardia, pt accepts a small dose ativan 0.5mg IV pt's wife at bedside, pt declined ativan for now, as he is anxious at baseline, accepts the diltiazem, wife is asking if can transfer to [place], advise that this is not possible in the middle of the night, have discussed case with [facility] if requires transfer urgently, wife asks if she can drive him to [place], advised that they would have to sign out AMA, risk of death given tachydysrhythmia, wife states will stay here for now, will see how pt does with medication pt's HR between 100-150, appears sinus with clear p waves when slows, have called back [facility] to speak with fellow, unclear that this is afib, as clearly having sinus beats in 150s, has multifocal pvcs, and while BP stable, request transfer to [facility] for cardiology consult and further work up/intervention EMS here for transfer

## 2019-07-10 PROBLEM — E78.00 PURE HYPERCHOLESTEROLEMIA, UNSPECIFIED: Chronic | Status: ACTIVE | Noted: 2019-07-04

## 2019-07-12 ENCOUNTER — APPOINTMENT (OUTPATIENT)
Dept: INTERNAL MEDICINE | Facility: CLINIC | Age: 49
End: 2019-07-12
Payer: COMMERCIAL

## 2019-07-12 VITALS
HEIGHT: 69 IN | TEMPERATURE: 98.4 F | HEART RATE: 78 BPM | DIASTOLIC BLOOD PRESSURE: 82 MMHG | WEIGHT: 212.13 LBS | OXYGEN SATURATION: 98 % | BODY MASS INDEX: 31.42 KG/M2 | RESPIRATION RATE: 16 BRPM | SYSTOLIC BLOOD PRESSURE: 140 MMHG

## 2019-07-12 DIAGNOSIS — Z86.39 PERSONAL HISTORY OF OTHER ENDOCRINE, NUTRITIONAL AND METABOLIC DISEASE: ICD-10-CM

## 2019-07-12 DIAGNOSIS — Z78.9 OTHER SPECIFIED HEALTH STATUS: ICD-10-CM

## 2019-07-12 DIAGNOSIS — I47.1 SUPRAVENTRICULAR TACHYCARDIA: ICD-10-CM

## 2019-07-12 DIAGNOSIS — F10.10 ALCOHOL ABUSE, UNCOMPLICATED: ICD-10-CM

## 2019-07-12 DIAGNOSIS — Z86.79 PERSONAL HISTORY OF OTHER DISEASES OF THE CIRCULATORY SYSTEM: ICD-10-CM

## 2019-07-12 DIAGNOSIS — G47.33 OBSTRUCTIVE SLEEP APNEA (ADULT) (PEDIATRIC): ICD-10-CM

## 2019-07-12 PROCEDURE — 99214 OFFICE O/P EST MOD 30 MIN: CPT

## 2019-07-14 PROBLEM — G47.33 MILD OBSTRUCTIVE SLEEP APNEA: Status: RESOLVED | Noted: 2019-07-12 | Resolved: 2019-07-14

## 2019-07-14 PROBLEM — F10.10 ALCOHOL ABUSE: Status: RESOLVED | Noted: 2019-07-14 | Resolved: 2019-07-14

## 2019-07-14 PROBLEM — Z86.39 HISTORY OF HYPERLIPIDEMIA: Status: RESOLVED | Noted: 2019-07-12 | Resolved: 2019-07-14

## 2019-07-14 PROBLEM — I47.1 ATRIAL TACHYCARDIA: Status: ACTIVE | Noted: 2019-07-12

## 2019-07-14 PROBLEM — Z78.9 DRINKS BEER: Status: ACTIVE | Noted: 2019-07-12

## 2019-07-14 PROBLEM — Z78.9 CONSUMES ALCOHOL: Status: ACTIVE | Noted: 2019-07-12

## 2019-07-14 PROBLEM — Z86.79 HISTORY OF MITRAL VALVE PROLAPSE: Status: RESOLVED | Noted: 2019-07-12 | Resolved: 2019-07-14

## 2019-07-14 PROBLEM — Z78.9 DRINKS HARD LIQUOR: Status: ACTIVE | Noted: 2019-07-12

## 2019-07-14 PROBLEM — Z86.39 HISTORY OF VITAMIN D DEFICIENCY: Status: RESOLVED | Noted: 2019-07-12 | Resolved: 2019-07-14

## 2019-07-14 NOTE — PHYSICAL EXAM
[Well Nourished] : well nourished [No Acute Distress] : no acute distress [Well Developed] : well developed [Normal Sclera/Conjunctiva] : normal sclera/conjunctiva [Well-Appearing] : well-appearing [PERRL] : pupils equal round and reactive to light [Normal Outer Ear/Nose] : the outer ears and nose were normal in appearance [EOMI] : extraocular movements intact [Normal Oropharynx] : the oropharynx was normal [No JVD] : no jugular venous distention [No Lymphadenopathy] : no lymphadenopathy [Supple] : supple [Thyroid Normal, No Nodules] : the thyroid was normal and there were no nodules present [No Respiratory Distress] : no respiratory distress  [No Accessory Muscle Use] : no accessory muscle use [Clear to Auscultation] : lungs were clear to auscultation bilaterally [Normal Rate] : normal rate  [Regular Rhythm] : with a regular rhythm [Normal S1, S2] : normal S1 and S2 [No Murmur] : no murmur heard [No Carotid Bruits] : no carotid bruits [No Abdominal Bruit] : a ~M bruit was not heard ~T in the abdomen [No Varicosities] : no varicosities [Pedal Pulses Present] : the pedal pulses are present [No Edema] : there was no peripheral edema [No Extremity Clubbing/Cyanosis] : no extremity clubbing/cyanosis [No Palpable Aorta] : no palpable aorta [Soft] : abdomen soft [Non-distended] : non-distended [Non Tender] : non-tender [No Masses] : no abdominal mass palpated [No HSM] : no HSM [Normal Bowel Sounds] : normal bowel sounds [Normal Posterior Cervical Nodes] : no posterior cervical lymphadenopathy [No CVA Tenderness] : no CVA  tenderness [Normal Anterior Cervical Nodes] : no anterior cervical lymphadenopathy [No Spinal Tenderness] : no spinal tenderness [No Joint Swelling] : no joint swelling [Grossly Normal Strength/Tone] : grossly normal strength/tone [No Rash] : no rash [Coordination Grossly Intact] : coordination grossly intact [No Focal Deficits] : no focal deficits [Normal Gait] : normal gait [Deep Tendon Reflexes (DTR)] : deep tendon reflexes were 2+ and symmetric [Normal Affect] : the affect was normal [Normal Insight/Judgement] : insight and judgment were intact [de-identified] : obese

## 2019-07-14 NOTE — HISTORY OF PRESENT ILLNESS
[FreeTextEntry1] : hospital follow-up  [de-identified] : Patient is a 49 year old male with a past medical history as below who presents for hospital follow-up. Patient was admitted to [facility] and then transferred to [place] for cardiac evaluation. He had noted waking up from sleep with palpitations, but denied any associated fever/chills. He also noted increased alcohol consumption 1 day prior. He was seen by an electrophysiologist and by cardiologist, Dr. Laurent. He was diagnosed with tachycardia secondary to alcohol consumption. EKG revealed atrial fibrillation at a rate of 180 BPM. Patient had blood work done at hospital.

## 2019-07-14 NOTE — PLAN
[FreeTextEntry1] : Cardiology\par atrial fibrillation - continue Metoprolol Succinate ER 50mg p.o.q.d. as directed - advised no alcohol consumption - referred to cardiologist, Dr. Moreno (EP)\par Endocrinology\par hyperlipidemia - continue low cholesterol/low fat diet \par

## 2019-07-14 NOTE — ADDENDUM
[FreeTextEntry1] : I, Jackson Victoria, acted solely as scribe for Dr. Remi Yuen DO on this date 07/12/2019  2:50PM .\par \par All medical record entries made by the Scribe were at my, Dr. Remi Yuen DO direction and personally dictated by me on 07/12/2019  2:50PM. I have reviewed the chart and agree that the record accurately reflects my personal performance of the history, physical exam, assessment and plan. I have also personally directed, reviewed and agreed with the chart.\par

## 2019-07-14 NOTE — ASSESSMENT
[FreeTextEntry1] : Patient is a 49 year old male with a past medical history as above who presents for hospital follow-up.

## 2019-07-29 ENCOUNTER — NON-APPOINTMENT (OUTPATIENT)
Age: 49
End: 2019-07-29

## 2019-07-29 ENCOUNTER — APPOINTMENT (OUTPATIENT)
Dept: ELECTROPHYSIOLOGY | Facility: CLINIC | Age: 49
End: 2019-07-29
Payer: COMMERCIAL

## 2019-07-29 VITALS
OXYGEN SATURATION: 100 % | HEIGHT: 69 IN | HEART RATE: 74 BPM | DIASTOLIC BLOOD PRESSURE: 83 MMHG | SYSTOLIC BLOOD PRESSURE: 142 MMHG

## 2019-07-29 PROCEDURE — 99214 OFFICE O/P EST MOD 30 MIN: CPT

## 2019-07-29 PROCEDURE — 93000 ELECTROCARDIOGRAM COMPLETE: CPT

## 2019-07-29 NOTE — PHYSICAL EXAM
[General Appearance - Well Developed] : well developed [Well Groomed] : well groomed [Normal Appearance] : normal appearance [General Appearance - Well Nourished] : well nourished [No Deformities] : no deformities [General Appearance - In No Acute Distress] : no acute distress [Normal Conjunctiva] : the conjunctiva exhibited no abnormalities [Eyelids - No Xanthelasma] : the eyelids demonstrated no xanthelasmas [Normal Oral Mucosa] : normal oral mucosa [No Oral Pallor] : no oral pallor [Normal Jugular Venous A Waves Present] : normal jugular venous A waves present [No Oral Cyanosis] : no oral cyanosis [Normal Jugular Venous V Waves Present] : normal jugular venous V waves present [Heart Rate And Rhythm] : heart rate and rhythm were normal [No Jugular Venous Donato A Waves] : no jugular venous donato A waves [Heart Sounds] : normal S1 and S2 [Murmurs] : no murmurs present [Respiration, Rhythm And Depth] : normal respiratory rhythm and effort [Exaggerated Use Of Accessory Muscles For Inspiration] : no accessory muscle use [Abdomen Soft] : soft [Auscultation Breath Sounds / Voice Sounds] : lungs were clear to auscultation bilaterally [Abdomen Mass (___ Cm)] : no abdominal mass palpated [Abdomen Tenderness] : non-tender [Gait - Sufficient For Exercise Testing] : the gait was sufficient for exercise testing [Abnormal Walk] : normal gait [Petechial Hemorrhages (___cm)] : no petechial hemorrhages [Cyanosis, Localized] : no localized cyanosis [Nail Clubbing] : no clubbing of the fingernails [Skin Color & Pigmentation] : normal skin color and pigmentation [] : no rash [No Venous Stasis] : no venous stasis [Skin Lesions] : no skin lesions [No Skin Ulcers] : no skin ulcer [No Xanthoma] : no  xanthoma was observed [Oriented To Time, Place, And Person] : oriented to person, place, and time [Affect] : the affect was normal [Mood] : the mood was normal [No Anxiety] : not feeling anxious

## 2019-07-29 NOTE — DISCUSSION/SUMMARY
[FreeTextEntry1] : In summary, this is a 49 year old man with an episode of pAT in the setting of likely low-level EtOH withdrawal and dehydration. The discussed the nature of his diagnosis and monitoring for further arrhythmia. He will begin taking beta blockers as "pill-in-pocket" therapy and event monitoring will be arranged today. I will call him with the results and he will return for TTE and follow up in 6 months.\par \par Mr. Mckinnon appeared to understand the whole discussion and verbalized that all of his questions were answered to his satisfaction.\par \par Thank you for allowing me to be involved in the care of this pleasant man. Please feel free to contact me with any questions.\par \par \par \par Cornelio Laurent MD\par  of Cardiology\par Electrophysiology Section\[address], [address] [place] [address]\par Office: [phone]\par Fax: [phone]\par

## 2019-07-29 NOTE — HISTORY OF PRESENT ILLNESS
[FreeTextEntry1] : Mr. Derrick Mckinnon was seen in the [facility] Electrophysiology Clinic today. For our records, please allow me to summarize the history and my findings.\par \par This pleasant 49 year old man has no significant past medical history. In early July 2019 he presented the [facility] ED with palpitatiosn and dizziness that had awoken him from sleep and was found to be having episodes of paroxysmal atrial tachycardia. He had been out that day in the sun celebrating July 4th and had consumed at least a 6-pack of beer and several vodka drinks. He was started on metoprolol with quieting of the arrhythmia and discharged the next day. He has since had no recurrent palpitations.\par \par Mr. Mckinnon denies any recent history of chest pain, shortness of breath, palpitations, dizziness, or syncope.\par \par DIAGNOSTIC TEST\par

## 2019-08-16 ENCOUNTER — APPOINTMENT (OUTPATIENT)
Dept: CV DIAGNOSITCS | Facility: HOSPITAL | Age: 49
End: 2019-08-16

## 2019-08-16 ENCOUNTER — OUTPATIENT (OUTPATIENT)
Dept: OUTPATIENT SERVICES | Facility: HOSPITAL | Age: 49
LOS: 1 days | End: 2019-08-16
Payer: COMMERCIAL

## 2019-08-16 DIAGNOSIS — I47.1 SUPRAVENTRICULAR TACHYCARDIA: ICD-10-CM

## 2019-08-16 PROCEDURE — 93306 TTE W/DOPPLER COMPLETE: CPT

## 2019-08-16 PROCEDURE — 93306 TTE W/DOPPLER COMPLETE: CPT | Mod: 26

## 2021-05-29 ENCOUNTER — TRANSCRIPTION ENCOUNTER (OUTPATIENT)
Age: 51
End: 2021-05-29

## 2021-06-01 ENCOUNTER — NON-APPOINTMENT (OUTPATIENT)
Age: 51
End: 2021-06-01

## 2021-06-01 ENCOUNTER — APPOINTMENT (OUTPATIENT)
Dept: INTERNAL MEDICINE | Facility: CLINIC | Age: 51
End: 2021-06-01
Payer: COMMERCIAL

## 2021-06-01 ENCOUNTER — LABORATORY RESULT (OUTPATIENT)
Age: 51
End: 2021-06-01

## 2021-06-01 VITALS
RESPIRATION RATE: 16 BRPM | DIASTOLIC BLOOD PRESSURE: 86 MMHG | SYSTOLIC BLOOD PRESSURE: 172 MMHG | HEART RATE: 88 BPM | WEIGHT: 203 LBS | HEIGHT: 69 IN | TEMPERATURE: 98.7 F | OXYGEN SATURATION: 98 % | BODY MASS INDEX: 30.07 KG/M2

## 2021-06-01 DIAGNOSIS — Z00.00 ENCOUNTER FOR GENERAL ADULT MEDICAL EXAMINATION W/OUT ABNORMAL FINDINGS: ICD-10-CM

## 2021-06-01 DIAGNOSIS — Z82.49 FAMILY HISTORY OF ISCHEMIC HEART DISEASE AND OTHER DISEASES OF THE CIRCULATORY SYSTEM: ICD-10-CM

## 2021-06-01 DIAGNOSIS — Z12.11 ENCOUNTER FOR SCREENING FOR MALIGNANT NEOPLASM OF COLON: ICD-10-CM

## 2021-06-01 DIAGNOSIS — R00.2 PALPITATIONS: ICD-10-CM

## 2021-06-01 DIAGNOSIS — R09.89 OTHER SPECIFIED SYMPTOMS AND SIGNS INVOLVING THE CIRCULATORY AND RESPIRATORY SYSTEMS: ICD-10-CM

## 2021-06-01 DIAGNOSIS — Z80.3 FAMILY HISTORY OF MALIGNANT NEOPLASM OF BREAST: ICD-10-CM

## 2021-06-01 PROCEDURE — 99072 ADDL SUPL MATRL&STAF TM PHE: CPT

## 2021-06-01 PROCEDURE — 93000 ELECTROCARDIOGRAM COMPLETE: CPT | Mod: 59

## 2021-06-01 PROCEDURE — 99396 PREV VISIT EST AGE 40-64: CPT | Mod: 25

## 2021-06-01 PROCEDURE — G0442 ANNUAL ALCOHOL SCREEN 15 MIN: CPT | Mod: NC

## 2021-06-01 RX ORDER — AMOXICILLIN AND CLAVULANATE POTASSIUM 875; 125 MG/1; MG/1
875-125 TABLET, COATED ORAL
Qty: 20 | Refills: 0 | Status: DISCONTINUED | COMMUNITY
Start: 2020-03-19 | End: 2021-06-01

## 2021-06-01 RX ORDER — METOPROLOL SUCCINATE 50 MG/1
50 TABLET, EXTENDED RELEASE ORAL
Qty: 30 | Refills: 5 | Status: DISCONTINUED | COMMUNITY
End: 2021-06-01

## 2021-06-02 VITALS — SYSTOLIC BLOOD PRESSURE: 160 MMHG | DIASTOLIC BLOOD PRESSURE: 80 MMHG

## 2021-06-02 NOTE — HEALTH RISK ASSESSMENT
[Good] : ~his/her~  mood as  good [Intercurrent Urgi Care visits] : went to urgent care [Yes] : Yes [2 - 3 times a week (3 pts)] : 2 - 3  times a week (3 points) [1 or 2 (0 pts)] : 1 or 2 (0 points) [Monthly (2 pts)] : Monthly (2 points) [No] : In the past 12 months have you used drugs other than those required for medical reasons? No [No falls in past year] : Patient reported no falls in the past year [0] : 2) Feeling down, depressed, or hopeless: Not at all (0) [HIV test declined] : HIV test declined [Hepatitis C test declined] : Hepatitis C test declined [None] : None [With Family] : lives with family [# of Members in Household ___] :  household currently consist of [unfilled] member(s) [Employed] : employed [Graduate School] : graduate school [] :  [# Of Children ___] : has [unfilled] children [Sexually Active] : sexually active [Feels Safe at Home] : Feels safe at home [Fully functional (bathing, dressing, toileting, transferring, walking, feeding)] : Fully functional (bathing, dressing, toileting, transferring, walking, feeding) [Fully functional (using the telephone, shopping, preparing meals, housekeeping, doing laundry, using] : Fully functional and needs no help or supervision to perform IADLs (using the telephone, shopping, preparing meals, housekeeping, doing laundry, using transportation, managing medications and managing finances) [Reports normal functional visual acuity (ie: able to read med bottle)] : Reports normal functional visual acuity [Smoke Detector] : smoke detector [Carbon Monoxide Detector] : carbon monoxide detector [Seat Belt] :  uses seat belt [] : No [de-identified] : sinus infection  [Audit-CScore] : 5 [de-identified] : Reg [HFX9Spuea] : 0 [Reports changes in hearing] : Reports no changes in hearing [Reports changes in vision] : Reports no changes in vision [Reports changes in dental health] : Reports no changes in dental health [ColonoscopyComments] : fit dna today  [AdvancecareDate] : 06/21

## 2021-06-02 NOTE — COUNSELING
[Encouraged to increase physical activity] : Encouraged to increase physical activity [Good understanding] : Patient has a good understanding of disease, goals and obesity follow-up plan [AUDIT-C Screening administered and reviewed] : AUDIT-C Screening administered and reviewed [Strategies to reduce or eliminate alcohol use discussed] : Strategies to reduce or eliminate alcohol use discussed [FreeTextEntry2] : cage 3/4

## 2021-06-02 NOTE — HISTORY OF PRESENT ILLNESS
[FreeTextEntry1] : Physical exam  [de-identified] : Mr. THORPE is a 51 year  male, who present to the office for physical exam \par States he been to urgent care viit a few time since his last visit her for the treatment of sinusitis \par States he always has elevated BP once he get there but when they recheck the BP it is normal\par Denies having HA, CP, SOB,GODINEZ.

## 2021-06-02 NOTE — PLAN
[FreeTextEntry1] : cardiopulmonary  -MVR - hx tachycardia     We reviewed and discussed the EKG  Patient was advised the importance of participating in aerobic exercise programs improve their stamina.  VIRAJ rashid was encourage to start an exercise program. Check labs.  Check echo and carotid sonogram \par \par Elevated BP -  Advised to RTO in 2 weeks for a BP check.  Advised low na diet \par \par Dermatology VIRAJ was encouraged to wear sun protective clothing, sun block, and proper use of SPF board brim hats, to seek shade and to avoid the sun in peak hours.  ABCD of skin moles was advised. \par \par Psych-  ETOH  - Discussed CAGE- Advised to decrease ETOH intake\par \par GI- Check Fit DNA test - Advised colonoscopy\par \par Check labs \par \par Endocrinology  -  BMI 29  Patient was educated about the importance of diet and exercise.   We discussed  a goal of a BMI near 25.   Patient was advised different treatment modalities including prescription medication as well as surgical procedures. \par \par Patient  education  - COVID-19   Counseling and education provided to the patient.  Advised sign and symptoms of the virus.  Advised contact precautions.  Educated patient on proper hand washing and to participate in social distancing. Completed COVID vax \par \par Patient is in full awareness of the plan and agrees to it.  All pt question was answered.  \par \par

## 2021-06-02 NOTE — PHYSICAL EXAM
[No Acute Distress] : no acute distress [Well Nourished] : well nourished [Well Developed] : well developed [Well-Appearing] : well-appearing [Normal Sclera/Conjunctiva] : normal sclera/conjunctiva [PERRL] : pupils equal round and reactive to light [EOMI] : extraocular movements intact [Normal Outer Ear/Nose] : the outer ears and nose were normal in appearance [Normal Oropharynx] : the oropharynx was normal [No JVD] : no jugular venous distention [No Lymphadenopathy] : no lymphadenopathy [Supple] : supple [Thyroid Normal, No Nodules] : the thyroid was normal and there were no nodules present [No Respiratory Distress] : no respiratory distress  [No Accessory Muscle Use] : no accessory muscle use [Clear to Auscultation] : lungs were clear to auscultation bilaterally [Normal Rate] : normal rate  [Regular Rhythm] : with a regular rhythm [Normal S1, S2] : normal S1 and S2 [No Abdominal Bruit] : a ~M bruit was not heard ~T in the abdomen [No Varicosities] : no varicosities [Pedal Pulses Present] : the pedal pulses are present [No Edema] : there was no peripheral edema [No Palpable Aorta] : no palpable aorta [No Extremity Clubbing/Cyanosis] : no extremity clubbing/cyanosis [Soft] : abdomen soft [Non Tender] : non-tender [Non-distended] : non-distended [No Masses] : no abdominal mass palpated [No HSM] : no HSM [Normal Bowel Sounds] : normal bowel sounds [Normal Posterior Cervical Nodes] : no posterior cervical lymphadenopathy [Normal Anterior Cervical Nodes] : no anterior cervical lymphadenopathy [No CVA Tenderness] : no CVA  tenderness [No Spinal Tenderness] : no spinal tenderness [No Joint Swelling] : no joint swelling [Grossly Normal Strength/Tone] : grossly normal strength/tone [No Rash] : no rash [Coordination Grossly Intact] : coordination grossly intact [No Focal Deficits] : no focal deficits [Normal Gait] : normal gait [Deep Tendon Reflexes (DTR)] : deep tendon reflexes were 2+ and symmetric [Normal Affect] : the affect was normal [Normal Insight/Judgement] : insight and judgment were intact [Normal TMs] : both tympanic membranes were normal [Normal Nasal Mucosa] : the nasal mucosa was normal [Urethral Meatus] : meatus normal [Penis Abnormality] : normal circumcised penis [Urinary Bladder Findings] : the bladder was normal on palpation [Scrotum] : the scrotum was normal [Rectal Exam - Seminal Vesicles] : the seminal vesicles were normal [Epididymis] : the epididymides were normal [Testes Tenderness] : no tenderness of the testes [Speech Grossly Normal] : speech grossly normal [Alert and Oriented x3] : oriented to person, place, and time [Normal Mood] : the mood was normal [de-identified] : with murmur  [de-identified] : ? bruit or radiation from Murmur

## 2021-06-15 ENCOUNTER — TRANSCRIPTION ENCOUNTER (OUTPATIENT)
Age: 51
End: 2021-06-15

## 2021-08-06 ENCOUNTER — APPOINTMENT (OUTPATIENT)
Dept: CARDIOLOGY | Facility: CLINIC | Age: 51
End: 2021-08-06
Payer: COMMERCIAL

## 2021-08-06 PROCEDURE — 93306 TTE W/DOPPLER COMPLETE: CPT

## 2021-08-06 PROCEDURE — 93880 EXTRACRANIAL BILAT STUDY: CPT

## 2021-08-12 ENCOUNTER — NON-APPOINTMENT (OUTPATIENT)
Age: 51
End: 2021-08-12

## 2021-08-16 ENCOUNTER — NON-APPOINTMENT (OUTPATIENT)
Age: 51
End: 2021-08-16

## 2021-08-16 ENCOUNTER — APPOINTMENT (OUTPATIENT)
Dept: ELECTROPHYSIOLOGY | Facility: CLINIC | Age: 51
End: 2021-08-16
Payer: COMMERCIAL

## 2021-08-16 VITALS
HEIGHT: 69 IN | SYSTOLIC BLOOD PRESSURE: 166 MMHG | BODY MASS INDEX: 29.47 KG/M2 | WEIGHT: 199 LBS | OXYGEN SATURATION: 99 % | DIASTOLIC BLOOD PRESSURE: 100 MMHG | HEART RATE: 90 BPM

## 2021-08-16 PROCEDURE — 93000 ELECTROCARDIOGRAM COMPLETE: CPT

## 2021-08-16 PROCEDURE — 99214 OFFICE O/P EST MOD 30 MIN: CPT

## 2021-08-16 RX ORDER — AMOXICILLIN 875 MG/1
875 TABLET, FILM COATED ORAL
Qty: 20 | Refills: 0 | Status: DISCONTINUED | COMMUNITY
Start: 2021-06-10 | End: 2021-08-16

## 2021-08-17 NOTE — PHYSICAL EXAM
[General Appearance - Well Developed] : well developed [Normal Appearance] : normal appearance [Well Groomed] : well groomed [General Appearance - Well Nourished] : well nourished [No Deformities] : no deformities [General Appearance - In No Acute Distress] : no acute distress [Normal Conjunctiva] : the conjunctiva exhibited no abnormalities [Eyelids - No Xanthelasma] : the eyelids demonstrated no xanthelasmas [Normal Oral Mucosa] : normal oral mucosa [No Oral Pallor] : no oral pallor [No Oral Cyanosis] : no oral cyanosis [Normal Jugular Venous A Waves Present] : normal jugular venous A waves present [Normal Jugular Venous V Waves Present] : normal jugular venous V waves present [No Jugular Venous Donato A Waves] : no jugular venous donato A waves [Heart Sounds] : normal S1 and S2 [Heart Rate And Rhythm] : heart rate and rhythm were normal [Murmurs] : no murmurs present [Respiration, Rhythm And Depth] : normal respiratory rhythm and effort [Exaggerated Use Of Accessory Muscles For Inspiration] : no accessory muscle use [Auscultation Breath Sounds / Voice Sounds] : lungs were clear to auscultation bilaterally [Abdomen Soft] : soft [Abdomen Tenderness] : non-tender [Abdomen Mass (___ Cm)] : no abdominal mass palpated [Abnormal Walk] : normal gait [Gait - Sufficient For Exercise Testing] : the gait was sufficient for exercise testing [Nail Clubbing] : no clubbing of the fingernails [Petechial Hemorrhages (___cm)] : no petechial hemorrhages [Cyanosis, Localized] : no localized cyanosis [Skin Color & Pigmentation] : normal skin color and pigmentation [No Venous Stasis] : no venous stasis [] : no rash [Skin Lesions] : no skin lesions [No Skin Ulcers] : no skin ulcer [No Xanthoma] : no  xanthoma was observed [Oriented To Time, Place, And Person] : oriented to person, place, and time [Affect] : the affect was normal [Mood] : the mood was normal [No Anxiety] : not feeling anxious [Normal] : moves all extremities, no focal deficits, normal speech

## 2021-09-07 NOTE — HISTORY OF PRESENT ILLNESS
[FreeTextEntry1] : Mr. Derrick Mckinnon was seen in the [facility] Electrophysiology Clinic today. For our records, please allow me to summarize the history and my findings.\par \par This pleasant 51 year old man has no significant past medical history. In early July 2019 he presented to the [facility] ED with palpitations and dizziness that had awoken him from sleep and was found to be having episodes of paroxysmal atrial tachycardia. He had been out that day in the sun celebrating July 4th and had consumed at least a 6-pack of beer and several vodka drinks. He was started on metoprolol with quieting of the arrhythmia and discharged the next day. He has since had no recurrent palpitations. \par \par He presents today for routine follow up. He reports feeling generally well. His main complaint today is high blood pressure. He does not check his BP at home but believes his BP is only high while in the doctor's office. No HA, blurry vision. Denies chest pain, shortness of breath, palpitations, dizziness, or syncope.\par

## 2021-09-07 NOTE — DISCUSSION/SUMMARY
[FreeTextEntry1] : In summary, this is a 51 year old man with an episode of pAT in 2019 in the setting of likely low-level EtOH withdrawal and dehydration. We are pleased to see he is doing well without recurrent arrhythmia. He is on no medications and has several recent hypertensive BP readings in his AS chart. \par \par Mr. Mckinnon was advised to purchase an ambulatory BP monitor and record BP at home for more accurate measurements.  Given the consistently elevated readings, amlodipine 5mg QD prescribed. He will follow up with his PCP over the next month for further BP follow up. He will be seen in our office as needed moving forward. \par \par Mr. Mckinnon appeared to understand the whole discussion and verbalized that all of his questions were answered to his satisfaction.\par \par Thank you for allowing me to be involved in the care of this pleasant man. Please feel free to contact me with any questions.\par \par \par \par Cornelio Laurent MD\par  of Cardiology\par Electrophysiology Section\par [address] [place] [address], [address] [place] [address]\[place] [address]\par Office: [phone]\par Fax: [phone]\par

## 2021-09-27 ENCOUNTER — APPOINTMENT (OUTPATIENT)
Dept: INTERNAL MEDICINE | Facility: CLINIC | Age: 51
End: 2021-09-27
Payer: COMMERCIAL

## 2021-09-27 ENCOUNTER — MED ADMIN CHARGE (OUTPATIENT)
Age: 51
End: 2021-09-27

## 2021-09-27 VITALS
HEIGHT: 69 IN | BODY MASS INDEX: 30.81 KG/M2 | OXYGEN SATURATION: 98 % | SYSTOLIC BLOOD PRESSURE: 150 MMHG | TEMPERATURE: 97.7 F | DIASTOLIC BLOOD PRESSURE: 82 MMHG | WEIGHT: 208 LBS | HEART RATE: 92 BPM | RESPIRATION RATE: 16 BRPM

## 2021-09-27 DIAGNOSIS — I48.91 UNSPECIFIED ATRIAL FIBRILLATION: ICD-10-CM

## 2021-09-27 DIAGNOSIS — Z23 ENCOUNTER FOR IMMUNIZATION: ICD-10-CM

## 2021-09-27 PROCEDURE — 90686 IIV4 VACC NO PRSV 0.5 ML IM: CPT

## 2021-09-27 PROCEDURE — G0008: CPT

## 2021-09-27 PROCEDURE — 99214 OFFICE O/P EST MOD 30 MIN: CPT | Mod: 25

## 2021-09-27 RX ORDER — AMLODIPINE BESYLATE 5 MG/1
5 TABLET ORAL DAILY
Qty: 30 | Refills: 2 | Status: DISCONTINUED | COMMUNITY
Start: 2021-08-16 | End: 2021-09-27

## 2021-09-30 LAB — HBA1C MFR BLD HPLC: 5

## 2021-10-03 PROBLEM — I48.91 ATRIAL FIBRILLATION: Status: ACTIVE | Noted: 2019-07-14

## 2021-10-03 LAB — LDLC SERPL DIRECT ASSAY-MCNC: 166

## 2021-10-03 NOTE — HEALTH RISK ASSESSMENT
[Intercurrent Urgi Care visits] : went to urgent care [Yes] : Yes [2 - 3 times a week (3 pts)] : 2 - 3  times a week (3 points) [1 or 2 (0 pts)] : 1 or 2 (0 points) [Monthly (2 pts)] : Monthly (2 points) [No] : In the past 12 months have you used drugs other than those required for medical reasons? No [No falls in past year] : Patient reported no falls in the past year [0] : 2) Feeling down, depressed, or hopeless: Not at all (0) [] : No [de-identified] : sinus infection  [Audit-CScore] : 5 [de-identified] : Reg [ONF1Sgyfu] : 0

## 2021-10-03 NOTE — DATA REVIEWED
[FreeTextEntry1] : Reviewed RN note\par Reviewed echo and recent labs\par Resulted Fit DNA\par Reviewed home BP reading that pt took on his own

## 2021-10-03 NOTE — REVIEW OF SYSTEMS
[Palpitations] : palpitations [Negative] : Heme/Lymph [Fever] : no fever [Chills] : no chills [Fatigue] : no fatigue [Earache] : no earache [Itching] : no itching [Sore Throat] : no sore throat [Chest Pain] : no chest pain [Lower Ext Edema] : no lower extremity edema [Shortness Of Breath] : no shortness of breath [Cough] : no cough [Dyspnea on Exertion] : not dyspnea on exertion [Constipation] : no constipation [Abdominal Pain] : no abdominal pain [Vomiting] : no vomiting [Melena] : no melena [Dysuria] : no dysuria [Hematuria] : no hematuria [Itching] : no itching [Headache] : no headache [Dizziness] : no dizziness [Unsteady Walk] : no ataxia [Swollen Glands] : no swollen glands

## 2021-10-03 NOTE — COUNSELING
[AUDIT-C Screening administered and reviewed] : AUDIT-C Screening administered and reviewed [Strategies to reduce or eliminate alcohol use discussed] : Strategies to reduce or eliminate alcohol use discussed [Encouraged to increase physical activity] : Encouraged to increase physical activity [Good understanding] : Patient has a good understanding of disease, goals and obesity follow-up plan [FreeTextEntry2] : cage 3/4

## 2021-10-03 NOTE — ASSESSMENT
[FreeTextEntry1] : Mr. THORPE is a 51 year  male, who present to the office for blood pressure check and  immunization update

## 2021-10-03 NOTE — PHYSICAL EXAM
[No Acute Distress] : no acute distress [Well Nourished] : well nourished [Well Developed] : well developed [Well-Appearing] : well-appearing [Normal Sclera/Conjunctiva] : normal sclera/conjunctiva [PERRL] : pupils equal round and reactive to light [EOMI] : extraocular movements intact [Normal Outer Ear/Nose] : the outer ears and nose were normal in appearance [Normal Oropharynx] : the oropharynx was normal [Normal TMs] : both tympanic membranes were normal [Normal Nasal Mucosa] : the nasal mucosa was normal [No JVD] : no jugular venous distention [No Lymphadenopathy] : no lymphadenopathy [Supple] : supple [Thyroid Normal, No Nodules] : the thyroid was normal and there were no nodules present [No Respiratory Distress] : no respiratory distress  [No Accessory Muscle Use] : no accessory muscle use [Clear to Auscultation] : lungs were clear to auscultation bilaterally [Normal Rate] : normal rate  [Regular Rhythm] : with a regular rhythm [Normal S1, S2] : normal S1 and S2 [No Abdominal Bruit] : a ~M bruit was not heard ~T in the abdomen [No Varicosities] : no varicosities [Pedal Pulses Present] : the pedal pulses are present [No Edema] : there was no peripheral edema [No Palpable Aorta] : no palpable aorta [No Extremity Clubbing/Cyanosis] : no extremity clubbing/cyanosis [Soft] : abdomen soft [Non Tender] : non-tender [Non-distended] : non-distended [No Masses] : no abdominal mass palpated [No HSM] : no HSM [Normal Bowel Sounds] : normal bowel sounds [Urethral Meatus] : meatus normal [Penis Abnormality] : normal circumcised penis [Urinary Bladder Findings] : the bladder was normal on palpation [Scrotum] : the scrotum was normal [Rectal Exam - Seminal Vesicles] : the seminal vesicles were normal [Epididymis] : the epididymides were normal [Testes Tenderness] : no tenderness of the testes [Normal Posterior Cervical Nodes] : no posterior cervical lymphadenopathy [Normal Anterior Cervical Nodes] : no anterior cervical lymphadenopathy [No CVA Tenderness] : no CVA  tenderness [No Spinal Tenderness] : no spinal tenderness [No Joint Swelling] : no joint swelling [Grossly Normal Strength/Tone] : grossly normal strength/tone [No Rash] : no rash [Coordination Grossly Intact] : coordination grossly intact [No Focal Deficits] : no focal deficits [Normal Gait] : normal gait [Deep Tendon Reflexes (DTR)] : deep tendon reflexes were 2+ and symmetric [Speech Grossly Normal] : speech grossly normal [Normal Affect] : the affect was normal [Alert and Oriented x3] : oriented to person, place, and time [Normal Mood] : the mood was normal [Normal Insight/Judgement] : insight and judgment were intact [de-identified] : with murmur  [de-identified] : ? bruit or radiation from Murmur

## 2021-10-03 NOTE — HISTORY OF PRESENT ILLNESS
[FreeTextEntry1] : BP pressure check and flu shot  [de-identified] : Mr. THORPE is a 51 year  male, who present to the office for blood pressure check\par states he saw cardio who gave an rx for Norvasc 5 mg.  Patient states he never started the medication.  Been monitoring his BP at home and mostly getting  high normal readings.  On occasion he will get a blood pressure over 140 and 0ver 90.\par Denies having chest pain, LBP, GODINEZ, Headaches,

## 2021-10-03 NOTE — PLAN
[FreeTextEntry1] : cardiopulmonary  -MVR - hx tachycardia     We reviewed echo and carotid sonogram.   Patient was advised the importance of participating in aerobic exercise programs improve their stamina.  VIRAJ rashid was encourage to start an exercise program. Check labs.  Check echo and carotid sonogram \par \par Elevated BP -  Advised to RTO in 2 weeks for a BP check.  Advised low na diet \par Advised to continue to monitor BP at home.  Advised to start Norvasc 2/5 mg daily \par \par Dermatology VIRAJ was encouraged to wear sun protective clothing, sun block, and proper use of SPF board brim hats, to seek shade and to avoid the sun in peak hours.  ABCD of skin moles was advised. \par \par Psych-  ETOH  - Discussed CAGE- Advised to decrease ETOH intake\par Endocrinology  -  BMI 29  Patient was educated about the importance of diet and exercise.   We discussed  a goal of a BMI near 25.   Patient was advised different treatment modalities including prescription medication as well as surgical procedures. \par \par Patient  education  - COVID-19   Counseling and education provided to the patient.  Advised sign and symptoms of the virus.  Advised contact precautions.  Educated patient on proper hand washing and to participate in social distancing. Completed COVID vax \par \par Patient is in full awareness of the plan and agrees to it.  All pt question was answered.  \par \par  Immunization Flu shot given - side effects advised - consent was given \par

## 2021-11-26 NOTE — REVIEW OF SYSTEMS
If you are a smoker, it is important for your health to stop smoking. Please be aware that second hand smoke is also harmful. [Palpitations] : palpitations [Negative] : Heme/Lymph [Fever] : no fever [Chills] : no chills [Fatigue] : no fatigue [Itching] : no itching [Earache] : no earache [Sore Throat] : no sore throat [Chest Pain] : no chest pain [Lower Ext Edema] : no lower extremity edema [Shortness Of Breath] : no shortness of breath [Cough] : no cough [Dyspnea on Exertion] : not dyspnea on exertion [Abdominal Pain] : no abdominal pain [Constipation] : no constipation [Vomiting] : no vomiting [Melena] : no melena [Dysuria] : no dysuria [Hematuria] : no hematuria [Headache] : no headache [Dizziness] : no dizziness [Unsteady Walk] : no ataxia [Swollen Glands] : no swollen glands

## 2022-02-10 ENCOUNTER — APPOINTMENT (OUTPATIENT)
Dept: INTERNAL MEDICINE | Facility: CLINIC | Age: 52
End: 2022-02-10
Payer: COMMERCIAL

## 2022-02-10 VITALS
OXYGEN SATURATION: 98 % | BODY MASS INDEX: 30.81 KG/M2 | SYSTOLIC BLOOD PRESSURE: 146 MMHG | WEIGHT: 208 LBS | HEIGHT: 69 IN | HEART RATE: 100 BPM | TEMPERATURE: 97.7 F | DIASTOLIC BLOOD PRESSURE: 80 MMHG | RESPIRATION RATE: 16 BRPM

## 2022-02-10 VITALS — SYSTOLIC BLOOD PRESSURE: 162 MMHG | DIASTOLIC BLOOD PRESSURE: 82 MMHG

## 2022-02-10 DIAGNOSIS — Z87.898 PERSONAL HISTORY OF OTHER SPECIFIED CONDITIONS: ICD-10-CM

## 2022-02-10 DIAGNOSIS — I10 ESSENTIAL (PRIMARY) HYPERTENSION: ICD-10-CM

## 2022-02-10 DIAGNOSIS — R03.0 ELEVATED BLOOD-PRESSURE READING, W/OUT DIAGNOSIS OF HYPERTENSION: ICD-10-CM

## 2022-02-10 DIAGNOSIS — Z87.09 PERSONAL HISTORY OF OTHER DISEASES OF THE RESPIRATORY SYSTEM: ICD-10-CM
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
## 2022-02-10 DIAGNOSIS — Z13.21 ENCOUNTER FOR SCREENING FOR NUTRITIONAL DISORDER: ICD-10-CM

## 2022-02-10 DIAGNOSIS — S20.462A INSECT BITE (NONVENOMOUS) OF LEFT BACK WALL OF THORAX, INITIAL ENCOUNTER: ICD-10-CM

## 2022-02-10 PROCEDURE — 99214 OFFICE O/P EST MOD 30 MIN: CPT

## 2022-02-10 NOTE — HISTORY OF PRESENT ILLNESS
[FreeTextEntry1] : Blood pressure check  [de-identified] : Mr. THORPE is a 51 year  male, who present to the office for blood pressure  check -  Patient states he feels well\par Been taking Norvasc 2.5 mg daily.  Denies checking his blood pressure at home.  States he did bring his machine in today to have it looked at and compared to the reading we get.  \par Denies CP, SOB, Dizzy, HA, fever or chills- Denies FLI\par \par States his wife is home with covid 19  infection took a home test and was positive \par States he  complete the covid vax

## 2022-02-10 NOTE — PLAN
[FreeTextEntry1] : cardiopulmonary  -MVR - hx tachycardia     We reviewed echo and carotid sonogram.   Patient was advised the importance of participating in aerobic exercise programs improve their stamina.  VIRAJ was encourage to start an exercise program. Check labs.  \par \par Elevated systolic BP -  Advised to RTO in 2 -3 weeks for a BP check.  Advised low na diet \par Advised to continue to monitor BP at home.  Advised to increase  Norvasc 5  mg daily.\par Call office next week with home BP reading if remains elevated will start HCTZ.  Pt agrees to recommendation.\par Extra time was need to review normal BP readings and goal of his BP-  How HCTZ works and well as CCB. \par \par Dermatology VIRAJ was encouraged to wear sun protective clothing, sun block, and proper use of SPF board brim hats, to seek shade and to avoid the sun in peak hours.  ABCD of skin moles was advised. \par \par Endocrinology  -  BMI 30   Patient was educated about the importance of diet and exercise.   We discussed  a goal of a BMI near 25.   Patient was advised different treatment modalities including prescription medication as well as surgical procedures. \par \par Patient  education  - COVID-19   Counseling and education provided to the patient.  Advised sign and symptoms of the virus.  Advised contact precautions.  Educated patient on proper hand washing and to participate in social distancing. Completed COVID vax \par Exposure to coivd and fully vax.  Advised to monitor for symptoms - IF notice any RTO for PCR testing \par \par I spent 30  Minutes with the patient, half of which we discussed finding on physical exam  and coordinated care.  As well as reviewed my plans and follow ups. \par \par Patient is in full awareness of the plan and agrees to it.  All pt question was answered.  \par \par \par

## 2022-02-10 NOTE — DATA REVIEWED
[FreeTextEntry1] : \par reviewed BP machine and  Home BP cuff  seems to be close to the reading we get in the office

## 2022-02-10 NOTE — REVIEW OF SYSTEMS
[Palpitations] : palpitations [Negative] : Heme/Lymph [Fever] : no fever [Chills] : no chills [Fatigue] : no fatigue [Itching] : no itching [Earache] : no earache [Sore Throat] : no sore throat [Chest Pain] : no chest pain [Lower Ext Edema] : no lower extremity edema [Shortness Of Breath] : no shortness of breath [Cough] : no cough [Dyspnea on Exertion] : not dyspnea on exertion [Abdominal Pain] : no abdominal pain [Constipation] : no constipation [Vomiting] : no vomiting [Melena] : no melena [Dysuria] : no dysuria [Hematuria] : no hematuria [Headache] : no headache [Dizziness] : no dizziness [Unsteady Walk] : no ataxia [Swollen Glands] : no swollen glands

## 2022-02-10 NOTE — HEALTH RISK ASSESSMENT
[Intercurrent Urgi Care visits] : went to urgent care [Yes] : Yes [2 - 3 times a week (3 pts)] : 2 - 3  times a week (3 points) [1 or 2 (0 pts)] : 1 or 2 (0 points) [Monthly (2 pts)] : Monthly (2 points) [No] : In the past 12 months have you used drugs other than those required for medical reasons? No [No falls in past year] : Patient reported no falls in the past year [0] : 2) Feeling down, depressed, or hopeless: Not at all (0) [PHQ-2 Negative - No further assessment needed] : PHQ-2 Negative - No further assessment needed [de-identified] : sinus infection  [Audit-CScore] : 5 [de-identified] : Reg [[id number]] : 0

## 2022-02-10 NOTE — PHYSICAL EXAM
[No Acute Distress] : no acute distress [Well Nourished] : well nourished [Well Developed] : well developed [Well-Appearing] : well-appearing [Normal Sclera/Conjunctiva] : normal sclera/conjunctiva [PERRL] : pupils equal round and reactive to light [EOMI] : extraocular movements intact [Normal Outer Ear/Nose] : the outer ears and nose were normal in appearance [Normal Oropharynx] : the oropharynx was normal [Normal TMs] : both tympanic membranes were normal [Normal Nasal Mucosa] : the nasal mucosa was normal [No JVD] : no jugular venous distention [No Lymphadenopathy] : no lymphadenopathy [Supple] : supple [Thyroid Normal, No Nodules] : the thyroid was normal and there were no nodules present [No Respiratory Distress] : no respiratory distress  [No Accessory Muscle Use] : no accessory muscle use [Clear to Auscultation] : lungs were clear to auscultation bilaterally [Normal Rate] : normal rate  [Regular Rhythm] : with a regular rhythm [Normal S1, S2] : normal S1 and S2 [No Abdominal Bruit] : a ~M bruit was not heard ~T in the abdomen [No Varicosities] : no varicosities [Pedal Pulses Present] : the pedal pulses are present [No Edema] : there was no peripheral edema [No Palpable Aorta] : no palpable aorta [No Extremity Clubbing/Cyanosis] : no extremity clubbing/cyanosis [Soft] : abdomen soft [Non Tender] : non-tender [Non-distended] : non-distended [No Masses] : no abdominal mass palpated [No HSM] : no HSM [Normal Bowel Sounds] : normal bowel sounds [Urethral Meatus] : meatus normal [Penis Abnormality] : normal circumcised penis [Urinary Bladder Findings] : the bladder was normal on palpation [Scrotum] : the scrotum was normal [Rectal Exam - Seminal Vesicles] : the seminal vesicles were normal [Epididymis] : the epididymides were normal [Testes Tenderness] : no tenderness of the testes [Normal Posterior Cervical Nodes] : no posterior cervical lymphadenopathy [Normal Anterior Cervical Nodes] : no anterior cervical lymphadenopathy [No CVA Tenderness] : no CVA  tenderness [No Spinal Tenderness] : no spinal tenderness [No Joint Swelling] : no joint swelling [Grossly Normal Strength/Tone] : grossly normal strength/tone [No Rash] : no rash [Coordination Grossly Intact] : coordination grossly intact [No Focal Deficits] : no focal deficits [Normal Gait] : normal gait [Deep Tendon Reflexes (DTR)] : deep tendon reflexes were 2+ and symmetric [Speech Grossly Normal] : speech grossly normal [Normal Affect] : the affect was normal [Alert and Oriented x3] : oriented to person, place, and time [Normal Mood] : the mood was normal [Normal Insight/Judgement] : insight and judgment were intact [de-identified] : with murmur  [de-identified] : ? bruit or radiation from Murmur

## 2022-02-10 NOTE — ASSESSMENT
[FreeTextEntry1] : Mr. THORPE is a 51 year  male, who present to the office for blood pressure check

## 2022-02-18 RX ORDER — HYDROCHLOROTHIAZIDE 12.5 MG/1
12.5 CAPSULE ORAL
Qty: 90 | Refills: 0 | Status: ACTIVE | COMMUNITY
Start: 2022-02-18 | End: 1900-01-01

## 2022-03-07 RX ORDER — AMLODIPINE BESYLATE 5 MG/1
5 TABLET ORAL
Qty: 90 | Refills: 0 | Status: ACTIVE | COMMUNITY
Start: 2021-09-27 | End: 1900-01-01

## 2023-04-11 ENCOUNTER — APPOINTMENT (OUTPATIENT)
Dept: ORTHOPEDIC SURGERY | Facility: CLINIC | Age: 53
End: 2023-04-11
Payer: COMMERCIAL

## 2023-04-11 VITALS — HEIGHT: 69 IN | BODY MASS INDEX: 25.18 KG/M2 | WEIGHT: 170 LBS

## 2023-04-11 DIAGNOSIS — M17.12 UNILATERAL PRIMARY OSTEOARTHRITIS, LEFT KNEE: ICD-10-CM

## 2023-04-11 DIAGNOSIS — M79.18 MYALGIA, OTHER SITE: ICD-10-CM

## 2023-04-11 PROCEDURE — 73564 X-RAY EXAM KNEE 4 OR MORE: CPT | Mod: LT

## 2023-04-11 PROCEDURE — L1833: CPT | Mod: LT

## 2023-04-11 PROCEDURE — 99204 OFFICE O/P NEW MOD 45 MIN: CPT

## 2023-04-11 PROCEDURE — L2397: CPT | Mod: LT

## 2023-04-11 RX ORDER — METHYLPREDNISOLONE 4 MG/1
4 TABLET ORAL
Qty: 1 | Refills: 0 | Status: ACTIVE | COMMUNITY
Start: 2023-04-11 | End: 1900-01-01

## 2023-04-11 NOTE — HISTORY OF PRESENT ILLNESS
[de-identified] : 53 year old male  (RHD, Teacher, golf) chronic left knee pain worsening since 4/8/23 after fly fishing\par The pain is located anterior superior patella and deep\par The pain is associated with swelling, tightness  \par Worse with activity and better at rest.\par Has tried biofreeze\par ***H/O heart stents put in 05/2022 - plavix - no nsaids**\par

## 2023-04-11 NOTE — IMAGING
[de-identified] : \par LEFT KNEE\par Inspection:  mild effusion\par Palpation: medial joint line tenderness, anterior tenderness\par Knee Range of Motion:  3-125 \par Strength: 5/5 Quadriceps strength, 5/5 Hamstring strength\par Neurological: light touch is intact throughout\par Ligament Stability and Special Tests: \par McMurrays: neg\par Lachman: neg\par Pivot Shift: neg\par Posterior Drawer: neg\par Valgus: neg\par Varus: neg\par Patella Apprehension: neg\par Patella Maltracking: neg\par

## 2023-04-11 NOTE — ASSESSMENT
[FreeTextEntry1] : Left X-Ray Examination of the KNEE (4 views): medial and patellofemoral degenerate changes.\par \par - We discussed their diagnosis and treatment options at length including the risks and benefits of both surgical treatment with a knee replacement and non-surgical options.\par - We will continue conservative treatment with activity modification, PT, icing, weight loss, and anti-inflammatory medications.\par - The patient was provided with a PT prescription to work on ROM, hip ER/abductors strengthening, quad/hamstring stretches and strengthening, and other exercises.\par - The patient was advised to let pain guide the gradual advancement of activities.\par - We discussed the possible of injections in the future.\par - MDP rx\par - Discussed the possible side effects of medication along with the timing and frequency for taking.\par - Follow up as needed in 6 weeks as to re-evaluate\par \par \par

## 2023-06-20 NOTE — ED PROVIDER NOTE - CONDUCTED A DETAILED DISCUSSION WITH PATIENT AND/OR GUARDIAN REGARDING, MDM
Quality 130: Documentation Of Current Medications In The Medical Record: Current Medications Documented Quality 110: Preventive Care And Screening: Influenza Immunization: Influenza Immunization Administered during Influenza season Quality 47: Advance Care Plan: Advance Care Planning discussed and documented in the medical record; patient did not wish or was not able to name a surrogate decision maker or provide an advance care plan. Quality 431: Preventive Care And Screening: Unhealthy Alcohol Use - Screening: Patient not identified as an unhealthy alcohol user when screened for unhealthy alcohol use using a systematic screening method Quality 226: Preventive Care And Screening: Tobacco Use: Screening And Cessation Intervention: Patient screened for tobacco use and is an ex/non-smoker Detail Level: Detailed Quality 111:Pneumonia Vaccination Status For Older Adults: Patient received any pneumococcal conjugate or polysaccharide vaccine on or after their 60th birthday and before the end of the measurement period need for outpatient follow-up/lab results

## 2023-09-11 NOTE — ED ADULT NURSE NOTE - NS ED NURSE LEVEL OF CONSCIOUSNESS ORIENTATION
Oriented - self; Oriented - place; Oriented - time Calcipotriene Pregnancy And Lactation Text: The use of this medication during pregnancy or lactation is not recommended as there is insufficient data.

## 2024-03-20 ENCOUNTER — APPOINTMENT (OUTPATIENT)
Dept: ORTHOPEDIC SURGERY | Facility: CLINIC | Age: 54
End: 2024-03-20
Payer: COMMERCIAL

## 2024-03-20 VITALS — HEIGHT: 69 IN | WEIGHT: 170 LBS | BODY MASS INDEX: 25.18 KG/M2

## 2024-03-20 DIAGNOSIS — M76.899 OTHER SPECIFIED ENTHESOPATHIES OF UNSPECIFIED LOWER LIMB, EXCLUDING FOOT: ICD-10-CM

## 2024-03-20 PROCEDURE — L2397: CPT | Mod: RT

## 2024-03-20 PROCEDURE — 73564 X-RAY EXAM KNEE 4 OR MORE: CPT | Mod: RT

## 2024-03-20 PROCEDURE — L1833: CPT | Mod: RT

## 2024-03-20 PROCEDURE — 99213 OFFICE O/P EST LOW 20 MIN: CPT | Mod: 25

## 2024-03-20 RX ORDER — METHYLPREDNISOLONE 4 MG/1
4 TABLET ORAL
Qty: 1 | Refills: 0 | Status: ACTIVE | COMMUNITY
Start: 2024-03-20 | End: 1900-01-01

## 2024-03-21 NOTE — HISTORY OF PRESENT ILLNESS
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 Has tried tylenol. Pain is anterior and associated with limited ROM, buckling. Worse with sitting to standing. Better with walking/standing.  H/O L knee bursitis/arthritis Dr. Perez  PM two stents, was told no NSAIDs, HTN, high cholesterol, on baby aspirin

## 2024-03-21 NOTE — DISCUSSION/SUMMARY
[de-identified] : 54m with right quadriceps tendinitis,  flexion limited by pain to 45 degrees 1) start physical therapy 2) unable to take NSAIDs - MDP rx 3) cryotherapy, rest and activity modification  4) playmaker Brace was appropriately fitted and dispensed. Pt left the office with brace as stated above. 5) rtc 6 weeks  Entered by Lola Tavares acting as scribe. Dr. Rodriguez- The documentation recorded by the scribe accurately reflects the service I personally performed and the decisions made by me.

## 2024-03-21 NOTE — PHYSICAL EXAM
[Right] : right knee [NL (0)] : extension 0 degrees [] : no pain with varus stress [FreeTextEntry9] : 45 degrees flexion with pain over the quadriceps

## 2024-05-01 ENCOUNTER — APPOINTMENT (OUTPATIENT)
Dept: ORTHOPEDIC SURGERY | Facility: CLINIC | Age: 54
End: 2024-05-01

## 2025-07-17 ENCOUNTER — APPOINTMENT (OUTPATIENT)
Dept: ORTHOPEDIC SURGERY | Facility: CLINIC | Age: 55
End: 2025-07-17

## 2025-07-17 VITALS — BODY MASS INDEX: 25.18 KG/M2 | WEIGHT: 170 LBS | HEIGHT: 69 IN

## 2025-07-17 PROBLEM — M25.512 LEFT SHOULDER PAIN: Status: ACTIVE | Noted: 2025-07-17

## 2025-07-17 PROCEDURE — 73010 X-RAY EXAM OF SHOULDER BLADE: CPT | Mod: LT

## 2025-07-17 PROCEDURE — 99214 OFFICE O/P EST MOD 30 MIN: CPT

## 2025-07-17 PROCEDURE — 73030 X-RAY EXAM OF SHOULDER: CPT | Mod: LT

## 2025-07-17 RX ORDER — NAPROXEN 500 MG/1
500 TABLET ORAL TWICE DAILY
Qty: 30 | Refills: 0 | Status: ACTIVE | COMMUNITY
Start: 2025-07-17 | End: 1900-01-01

## 2025-07-18 ENCOUNTER — APPOINTMENT (OUTPATIENT)
Dept: MRI IMAGING | Facility: CLINIC | Age: 55
End: 2025-07-18
Payer: COMMERCIAL

## 2025-07-18 PROCEDURE — 73221 MRI JOINT UPR EXTREM W/O DYE: CPT | Mod: LT

## 2025-07-22 ENCOUNTER — APPOINTMENT (OUTPATIENT)
Dept: ORTHOPEDIC SURGERY | Facility: CLINIC | Age: 55
End: 2025-07-22

## 2025-07-23 ENCOUNTER — APPOINTMENT (OUTPATIENT)
Dept: ORTHOPEDIC SURGERY | Facility: CLINIC | Age: 55
End: 2025-07-23

## 2025-07-23 ENCOUNTER — APPOINTMENT (OUTPATIENT)
Dept: ORTHOPEDIC SURGERY | Facility: CLINIC | Age: 55
End: 2025-07-23
Payer: COMMERCIAL

## 2025-07-23 VITALS — BODY MASS INDEX: 25.18 KG/M2 | HEIGHT: 69 IN | WEIGHT: 170 LBS

## 2025-07-23 DIAGNOSIS — S46.012A STRAIN OF MUSCLE(S) AND TENDON(S) OF THE ROTATOR CUFF OF LEFT SHOULDER, INITIAL ENCOUNTER: ICD-10-CM

## 2025-07-23 PROCEDURE — L3670: CPT

## 2025-07-23 PROCEDURE — 99214 OFFICE O/P EST MOD 30 MIN: CPT
